# Patient Record
Sex: FEMALE | Race: WHITE | URBAN - METROPOLITAN AREA
[De-identification: names, ages, dates, MRNs, and addresses within clinical notes are randomized per-mention and may not be internally consistent; named-entity substitution may affect disease eponyms.]

---

## 2017-01-11 ENCOUNTER — OUTPATIENT (OUTPATIENT)
Dept: OUTPATIENT SERVICES | Facility: HOSPITAL | Age: 62
LOS: 1 days | End: 2017-01-11
Payer: COMMERCIAL

## 2017-01-11 ENCOUNTER — APPOINTMENT (OUTPATIENT)
Dept: UROLOGY | Facility: CLINIC | Age: 62
End: 2017-01-11

## 2017-01-11 DIAGNOSIS — N13.30 UNSPECIFIED HYDRONEPHROSIS: ICD-10-CM

## 2017-01-11 DIAGNOSIS — Z98.89 OTHER SPECIFIED POSTPROCEDURAL STATES: Chronic | ICD-10-CM

## 2017-01-11 DIAGNOSIS — E66.9 OBESITY, UNSPECIFIED: ICD-10-CM

## 2017-01-11 DIAGNOSIS — Z98.49 CATARACT EXTRACTION STATUS, UNSPECIFIED EYE: Chronic | ICD-10-CM

## 2017-01-11 DIAGNOSIS — R35.0 FREQUENCY OF MICTURITION: ICD-10-CM

## 2017-01-11 PROCEDURE — 76775 US EXAM ABDO BACK WALL LIM: CPT

## 2017-01-12 DIAGNOSIS — N13.30 UNSPECIFIED HYDRONEPHROSIS: ICD-10-CM

## 2017-01-12 DIAGNOSIS — N20.0 CALCULUS OF KIDNEY: ICD-10-CM

## 2017-01-12 DIAGNOSIS — E66.9 OBESITY, UNSPECIFIED: ICD-10-CM

## 2017-02-10 ENCOUNTER — APPOINTMENT (OUTPATIENT)
Dept: PULMONOLOGY | Facility: CLINIC | Age: 62
End: 2017-02-10

## 2017-03-09 ENCOUNTER — RX RENEWAL (OUTPATIENT)
Age: 62
End: 2017-03-09

## 2017-03-13 PROBLEM — Z29.9 PROPHYLACTIC MEASURE: Status: ACTIVE | Noted: 2017-03-13

## 2017-04-24 ENCOUNTER — RX RENEWAL (OUTPATIENT)
Age: 62
End: 2017-04-24

## 2017-07-19 ENCOUNTER — OUTPATIENT (OUTPATIENT)
Dept: OUTPATIENT SERVICES | Facility: HOSPITAL | Age: 62
LOS: 1 days | End: 2017-07-19
Payer: COMMERCIAL

## 2017-07-19 ENCOUNTER — APPOINTMENT (OUTPATIENT)
Dept: UROLOGY | Facility: CLINIC | Age: 62
End: 2017-07-19

## 2017-07-19 DIAGNOSIS — R35.0 FREQUENCY OF MICTURITION: ICD-10-CM

## 2017-07-19 DIAGNOSIS — Z98.89 OTHER SPECIFIED POSTPROCEDURAL STATES: Chronic | ICD-10-CM

## 2017-07-19 DIAGNOSIS — Z98.49 CATARACT EXTRACTION STATUS, UNSPECIFIED EYE: Chronic | ICD-10-CM

## 2017-07-19 PROCEDURE — 76775 US EXAM ABDO BACK WALL LIM: CPT

## 2017-07-21 DIAGNOSIS — N20.0 CALCULUS OF KIDNEY: ICD-10-CM

## 2017-09-04 ENCOUNTER — RX RENEWAL (OUTPATIENT)
Age: 62
End: 2017-09-04

## 2017-09-06 ENCOUNTER — RX RENEWAL (OUTPATIENT)
Age: 62
End: 2017-09-06

## 2017-09-21 ENCOUNTER — RX RENEWAL (OUTPATIENT)
Age: 62
End: 2017-09-21

## 2017-10-17 ENCOUNTER — RX RENEWAL (OUTPATIENT)
Age: 62
End: 2017-10-17

## 2017-10-22 ENCOUNTER — RX RENEWAL (OUTPATIENT)
Age: 62
End: 2017-10-22

## 2017-10-24 ENCOUNTER — APPOINTMENT (OUTPATIENT)
Dept: PULMONOLOGY | Facility: CLINIC | Age: 62
End: 2017-10-24

## 2017-11-14 ENCOUNTER — OUTPATIENT (OUTPATIENT)
Dept: OUTPATIENT SERVICES | Facility: HOSPITAL | Age: 62
LOS: 1 days | End: 2017-11-14
Payer: COMMERCIAL

## 2017-11-14 ENCOUNTER — APPOINTMENT (OUTPATIENT)
Dept: PULMONOLOGY | Facility: CLINIC | Age: 62
End: 2017-11-14
Payer: COMMERCIAL

## 2017-11-14 VITALS
WEIGHT: 192.02 LBS | HEIGHT: 65 IN | OXYGEN SATURATION: 98 % | SYSTOLIC BLOOD PRESSURE: 136 MMHG | HEART RATE: 73 BPM | RESPIRATION RATE: 16 BRPM | TEMPERATURE: 99 F | DIASTOLIC BLOOD PRESSURE: 84 MMHG

## 2017-11-14 VITALS
OXYGEN SATURATION: 96 % | WEIGHT: 186 LBS | HEART RATE: 82 BPM | HEIGHT: 64 IN | SYSTOLIC BLOOD PRESSURE: 126 MMHG | BODY MASS INDEX: 31.76 KG/M2 | DIASTOLIC BLOOD PRESSURE: 80 MMHG

## 2017-11-14 DIAGNOSIS — J30.9 ALLERGIC RHINITIS, UNSPECIFIED: ICD-10-CM

## 2017-11-14 DIAGNOSIS — M19.042 PRIMARY OSTEOARTHRITIS, LEFT HAND: ICD-10-CM

## 2017-11-14 DIAGNOSIS — Z98.49 CATARACT EXTRACTION STATUS, UNSPECIFIED EYE: Chronic | ICD-10-CM

## 2017-11-14 DIAGNOSIS — G25.81 RESTLESS LEGS SYNDROME: ICD-10-CM

## 2017-11-14 DIAGNOSIS — K21.9 GASTRO-ESOPHAGEAL REFLUX DISEASE W/OUT ESOPHAGITIS: ICD-10-CM

## 2017-11-14 DIAGNOSIS — R76.8 OTHER SPECIFIED ABNORMAL IMMUNOLOGICAL FINDINGS IN SERUM: ICD-10-CM

## 2017-11-14 DIAGNOSIS — E11.9 TYPE 2 DIABETES MELLITUS WITHOUT COMPLICATIONS: ICD-10-CM

## 2017-11-14 DIAGNOSIS — G47.33 OBSTRUCTIVE SLEEP APNEA (ADULT) (PEDIATRIC): ICD-10-CM

## 2017-11-14 DIAGNOSIS — I67.1 CEREBRAL ANEURYSM, NONRUPTURED: ICD-10-CM

## 2017-11-14 DIAGNOSIS — Z98.89 OTHER SPECIFIED POSTPROCEDURAL STATES: Chronic | ICD-10-CM

## 2017-11-14 LAB
BUN SERPL-MCNC: 11 MG/DL — SIGNIFICANT CHANGE UP (ref 7–23)
CALCIUM SERPL-MCNC: 9 MG/DL — SIGNIFICANT CHANGE UP (ref 8.4–10.5)
CHLORIDE SERPL-SCNC: 99 MMOL/L — SIGNIFICANT CHANGE UP (ref 98–107)
CO2 SERPL-SCNC: 26 MMOL/L — SIGNIFICANT CHANGE UP (ref 22–31)
CREAT SERPL-MCNC: 0.74 MG/DL — SIGNIFICANT CHANGE UP (ref 0.5–1.3)
GLUCOSE SERPL-MCNC: 135 MG/DL — HIGH (ref 70–99)
HBA1C BLD-MCNC: 6.9 % — HIGH (ref 4–5.6)
HCT VFR BLD CALC: 39.4 % — SIGNIFICANT CHANGE UP (ref 34.5–45)
HGB BLD-MCNC: 13.2 G/DL — SIGNIFICANT CHANGE UP (ref 11.5–15.5)
MCHC RBC-ENTMCNC: 31.1 PG — SIGNIFICANT CHANGE UP (ref 27–34)
MCHC RBC-ENTMCNC: 33.5 % — SIGNIFICANT CHANGE UP (ref 32–36)
MCV RBC AUTO: 92.7 FL — SIGNIFICANT CHANGE UP (ref 80–100)
NRBC # FLD: 0 — SIGNIFICANT CHANGE UP
PLATELET # BLD AUTO: 177 K/UL — SIGNIFICANT CHANGE UP (ref 150–400)
PMV BLD: 11.9 FL — SIGNIFICANT CHANGE UP (ref 7–13)
POTASSIUM SERPL-MCNC: 3 MMOL/L — LOW (ref 3.5–5.3)
POTASSIUM SERPL-SCNC: 3 MMOL/L — LOW (ref 3.5–5.3)
RBC # BLD: 4.25 M/UL — SIGNIFICANT CHANGE UP (ref 3.8–5.2)
RBC # FLD: 12.4 % — SIGNIFICANT CHANGE UP (ref 10.3–14.5)
SODIUM SERPL-SCNC: 140 MMOL/L — SIGNIFICANT CHANGE UP (ref 135–145)
WBC # BLD: 5.88 K/UL — SIGNIFICANT CHANGE UP (ref 3.8–10.5)
WBC # FLD AUTO: 5.88 K/UL — SIGNIFICANT CHANGE UP (ref 3.8–10.5)

## 2017-11-14 PROCEDURE — 99214 OFFICE O/P EST MOD 30 MIN: CPT

## 2017-11-14 PROCEDURE — 93010 ELECTROCARDIOGRAM REPORT: CPT

## 2017-11-14 RX ORDER — MONTELUKAST SODIUM 10 MG/1
10 TABLET, FILM COATED ORAL
Qty: 1 | Refills: 1 | Status: ACTIVE | COMMUNITY
Start: 2017-11-14 | End: 1900-01-01

## 2017-11-14 NOTE — H&P PST ADULT - PMH
Asthma  never been intubated, or hospitalized.,  Carpal Tunnel Syndrome    Cervical herniated disc    Diabetes mellitus  II  DVT (deep venous thrombosis)  right lower leg 2015, s/p right knee surgery to correct congenital abnormality  GERD (Gastroesophageal Reflux Disease)    HTN - Hypertension    Hyperlipidemia    Migraine    MVP (Mitral Valve Prolapse)    Obesity    Renal Calculi    TIA (transient ischemic attack)  x3 last one in 2/16

## 2017-11-14 NOTE — H&P PST ADULT - RS GEN PE MLT RESP DETAILS PC
respirations non-labored/no subcutaneous emphysema/no rhonchi/no wheezes/no chest wall tenderness/no intercostal retractions/clear to auscultation bilaterally/airway patent/good air movement/no rales/breath sounds equal

## 2017-11-14 NOTE — H&P PST ADULT - PROBLEM SELECTOR PLAN 2
Monitor BS on day of surgery. Pt instructed to hold metformin 24 hours prior to surgery. Pt verbalized understanding.

## 2017-11-14 NOTE — H&P PST ADULT - NEGATIVE GENERAL SYMPTOMS
no polyuria/no anorexia/no weight loss/no weight gain/no polyphagia/no fever/no sweating/no chills/no fatigue

## 2017-11-14 NOTE — H&P PST ADULT - NEGATIVE CARDIOVASCULAR SYMPTOMS
no claudication/no chest pain/no palpitations/no paroxysmal nocturnal dyspnea/no dyspnea on exertion/no orthopnea

## 2017-11-14 NOTE — H&P PST ADULT - PSH
Brain Aneurysm (coiling)  2008  Cervical Fusion x 2  1999, 2000  H/O cataract extraction  2013  S/P Arthroscopic Knee Surgery  2006  S/P Total Abdominal Hysterectomy  1991

## 2017-11-14 NOTE — H&P PST ADULT - NEGATIVE ENMT SYMPTOMS
no vertigo/no nasal congestion/no gum bleeding/no throat pain/no nose bleeds/no nasal obstruction/no post-nasal discharge/no abnormal taste sensation/no nasal discharge/no sinus symptoms/no hearing difficulty/no dysphagia/no ear pain/no tinnitus

## 2017-11-14 NOTE — H&P PST ADULT - NEGATIVE FEMALE-SPECIFIC SYMPTOMS
no abnormal vaginal bleeding/no pelvic pain/no amenorrhea/no vaginal discharge/no menorrhagia/no spotting

## 2017-11-14 NOTE — H&P PST ADULT - NEGATIVE OPHTHALMOLOGIC SYMPTOMS
no lacrimation L/no diplopia/no blurred vision R/no discharge R/no pain L/no irritation L/no loss of vision R/no lacrimation R/no blurred vision L/no pain R/no irritation R/no loss of vision L

## 2017-11-14 NOTE — H&P PST ADULT - NSANTHOSAYNRD_GEN_A_CORE
No. BRYN screening performed.  STOP BANG Legend: 0-2 = LOW Risk; 3-4 = INTERMEDIATE Risk; 5-8 = HIGH Risk

## 2017-11-14 NOTE — H&P PST ADULT - MUSCULOSKELETAL
details… No joint pain, swelling or deformity; no limitation of movement detailed exam left ring finger/decreased ROM due to pain

## 2017-11-14 NOTE — H&P PST ADULT - PROBLEM SELECTOR PLAN 1
Scheduled for left ring finger trigger release on 11/20/17. Pre op instructions given and explained. Pt verbalized understanding.

## 2017-11-14 NOTE — H&P PST ADULT - NEGATIVE GASTROINTESTINAL SYMPTOMS
no change in bowel habits/no melena/no nausea/no vomiting/no diarrhea/no constipation/no abdominal pain/no hiccoughs

## 2017-11-14 NOTE — H&P PST ADULT - NEGATIVE GENERAL GENITOURINARY SYMPTOMS
no bladder infections/no dysuria/no urinary hesitancy/no hematuria/no nocturia/no flank pain R/no flank pain L/normal libido/no urine discoloration/no gas in urine

## 2017-11-14 NOTE — H&P PST ADULT - NEGATIVE BREAST SYMPTOMS
no breast tenderness L/no breast tenderness R/no nipple discharge R/no nipple discharge L/no breast lump R/no breast lump L

## 2017-11-14 NOTE — H&P PST ADULT - NEGATIVE NEUROLOGICAL SYMPTOMS
no generalized seizures/no focal seizures/no syncope/no vertigo/no loss of consciousness/no hemiparesis/no paresthesias/no tremors/no confusion/no weakness

## 2017-11-14 NOTE — H&P PST ADULT - NECK DETAILS
normal thyroid gland/supple/no JVD decreased ROM on lateral bending/supple/no JVD/normal thyroid gland

## 2017-11-14 NOTE — H&P PST ADULT - HISTORY OF PRESENT ILLNESS
62 yr old  female with PMH: Brain Aneurysm, HTN, HLD, h/o renal calculi, pre op diagnosis left hydronephrosis renal calculi, presents to PST for pre surgical evaluation for cystoscopy left ureteroscopy and stent insertion on 4/25/16  Pt PMH includes Cervical fusion with hardware 2000 and limited mobility of neck, Ruptured Brain Aneurysm with coil placement 2008  residual HA and decreased ability for complicated sensory input.   Pt also states Hx of DVT, RLE following knee surgery 2013, 2015 and CVA following brain angiogram done last year  as f/u testing - no residual deficits. 62 yr old  female with PMH: Ruptured Brain Aneurysm with coil placement 2008  residual HA, HTN, DVT- RLE, HLD, h/o renal calculi s/p  cystoscopy left ureteroscopy and stent insertion on 4/25/16. Cervical fusion with hardware 2000 and limited mobility of neck and decreased ability for complicated sensory input. Pt presents to PST for pre op evaluation with hx of left ring finger pain. Now scheduled for left ring finger trigger release on 11/20/17

## 2017-11-15 ENCOUNTER — APPOINTMENT (OUTPATIENT)
Dept: PULMONOLOGY | Facility: CLINIC | Age: 62
End: 2017-11-15

## 2017-11-20 ENCOUNTER — TRANSCRIPTION ENCOUNTER (OUTPATIENT)
Age: 62
End: 2017-11-20

## 2017-11-20 ENCOUNTER — OUTPATIENT (OUTPATIENT)
Dept: OUTPATIENT SERVICES | Facility: HOSPITAL | Age: 62
LOS: 1 days | Discharge: HOME | End: 2017-11-20

## 2017-11-20 VITALS
HEIGHT: 65 IN | DIASTOLIC BLOOD PRESSURE: 84 MMHG | WEIGHT: 192.02 LBS | RESPIRATION RATE: 16 BRPM | OXYGEN SATURATION: 98 % | HEART RATE: 73 BPM | TEMPERATURE: 99 F | SYSTOLIC BLOOD PRESSURE: 136 MMHG

## 2017-11-20 VITALS
DIASTOLIC BLOOD PRESSURE: 73 MMHG | SYSTOLIC BLOOD PRESSURE: 146 MMHG | OXYGEN SATURATION: 100 % | RESPIRATION RATE: 16 BRPM

## 2017-11-20 DIAGNOSIS — Z98.49 CATARACT EXTRACTION STATUS, UNSPECIFIED EYE: Chronic | ICD-10-CM

## 2017-11-20 DIAGNOSIS — M19.042 PRIMARY OSTEOARTHRITIS, LEFT HAND: ICD-10-CM

## 2017-11-20 DIAGNOSIS — Z98.89 OTHER SPECIFIED POSTPROCEDURAL STATES: Chronic | ICD-10-CM

## 2017-11-20 NOTE — BRIEF OPERATIVE NOTE - PROCEDURE
<<-----Click on this checkbox to enter Procedure Trigger finger release  11/20/2017    Active  NATALIOO

## 2017-11-20 NOTE — ASU DISCHARGE PLAN (ADULT/PEDIATRIC). - ITEMS TO FOLLOWUP WITH YOUR PHYSICIAN'S
Please follow up with Dr. Vee within 1-2 weeks. You may call [phone] to schedule an appointment.    You may resume a regular diet and regular activities. Please do not participate in heavy lifting or strenuous exercise. Do not drive while taking pain medication.    Please go to the emergency department if you experience pain not controlled by pain medication, bleeding that will not stop, fevers or chills.

## 2017-11-20 NOTE — ASU DISCHARGE PLAN (ADULT/PEDIATRIC). - INSTRUCTIONS
You may resume a regular diet You may resume a regular diet and regular activities. Please do not participate in heavy lifting or strenuous exercise. Do not drive while taking pain medication.    Please go to the emergency department if you experience pain not controlled by pain medication, bleeding that will not stop, fevers or chills. You may resume a regular diet... Keep first meal light. Nothing fried, spicy or greasy. Increase fluids.

## 2017-11-20 NOTE — ASU DISCHARGE PLAN (ADULT/PEDIATRIC). - NOTIFY
Fever greater than 101/Pain not relieved by Medications/Bleeding that does not stop Fever greater than 101/Persistent Nausea and Vomiting/Unable to Urinate/Pain not relieved by Medications/Swelling that continues/Bleeding that does not stop/Numbness, color, or temperature change to extremity

## 2017-11-20 NOTE — ASU DISCHARGE PLAN (ADULT/PEDIATRIC). - MEDICATION SUMMARY - MEDICATIONS TO TAKE
I will START or STAY ON the medications listed below when I get home from the hospital:    predniSONE 50 mg oral tablet  -- 1 tab(s) by mouth once a day  -- It is very important that you take or use this exactly as directed.  Do not skip doses or discontinue unless directed by your doctor.  Obtain medical advice before taking any non-prescription drugs as some may affect the action of this medication.  Take with food or milk.    -- Indication: For Steroid    oxyCODONE-acetaminophen 5 mg-325 mg oral tablet  -- 1 tab(s) by mouth every 6 hours MDD:4 per day  -- Caution federal law prohibits the transfer of this drug to any person other  than the person for whom it was prescribed.  May cause drowsiness.  Alcohol may intensify this effect.  Use care when operating dangerous machinery.  This prescription cannot be refilled.  This product contains acetaminophen.  Do not use  with any other product containing acetaminophen to prevent possible liver damage.  Using more of this medication than prescribed may cause serious breathing problems.    -- Indication: For Pain    oxyCODONE 5 mg oral tablet  -- 1 tab(s) by mouth every 6 hours MDD:4  -- Caution federal law prohibits the transfer of this drug to any person other  than the person for whom it was prescribed.  It is very important that you take or use this exactly as directed.  Do not skip doses or discontinue unless directed by your doctor.  May cause drowsiness.  Alcohol may intensify this effect.  Use care when operating dangerous machinery.  This prescription cannot be refilled.  Using more of this medication than prescribed may cause serious breathing problems.    -- Indication: For Pain    Flomax 0.4 mg oral capsule  -- 1 cap(s) by mouth once a day  -- It is very important that you take or use this exactly as directed.  Do not skip doses or discontinue unless directed by your doctor.  May cause drowsiness.  Alcohol may intensify this effect.  Use care when operating dangerous machinery.  Some non-prescription drugs may aggravate your condition.  Read all labels carefully.  If a warning appears, check with your doctor before taking.  Swallow whole.  Do not crush.  Take with food or milk.    -- Indication: For BPH    Allegra  --  by mouth   -- Indication: For Allergy    Zocor 40 mg oral tablet  -- 1 tab(s) by mouth once a day (at bedtime)  -- Indication: For HLD    Lunesta 3 mg oral tablet  -- 1 tab(s) by mouth once a day (at bedtime)  -- Indication: For Mood    Advair Diskus  --  inhaled   -- Indication: For Asthma    Norvasc 5 mg oral tablet  -- 1 tab(s) by mouth once a day  -- Indication: For HTN    cefpodoxime 200 mg oral tablet  -- 1 tab(s) by mouth every 12 hours  -- Finish all this medication unless otherwise directed by prescriber.  Take with food or milk.    -- Indication: For Infection    Singulair  --  by mouth   -- Indication: For Asthma    NexIUM 40 mg oral delayed release capsule  -- 1 cap(s) by mouth once a day  -- Indication: For GERD

## 2018-01-25 ENCOUNTER — RX RENEWAL (OUTPATIENT)
Age: 63
End: 2018-01-25

## 2018-01-30 ENCOUNTER — RX RENEWAL (OUTPATIENT)
Age: 63
End: 2018-01-30

## 2018-02-07 ENCOUNTER — APPOINTMENT (OUTPATIENT)
Dept: UROLOGY | Facility: CLINIC | Age: 63
End: 2018-02-07

## 2018-02-25 ENCOUNTER — RX RENEWAL (OUTPATIENT)
Age: 63
End: 2018-02-25

## 2018-02-26 ENCOUNTER — RX RENEWAL (OUTPATIENT)
Age: 63
End: 2018-02-26

## 2018-03-10 ENCOUNTER — OUTPATIENT (OUTPATIENT)
Dept: OUTPATIENT SERVICES | Facility: HOSPITAL | Age: 63
LOS: 1 days | End: 2018-03-10
Payer: COMMERCIAL

## 2018-03-10 ENCOUNTER — APPOINTMENT (OUTPATIENT)
Dept: ULTRASOUND IMAGING | Facility: IMAGING CENTER | Age: 63
End: 2018-03-10
Payer: COMMERCIAL

## 2018-03-10 DIAGNOSIS — Z98.49 CATARACT EXTRACTION STATUS, UNSPECIFIED EYE: Chronic | ICD-10-CM

## 2018-03-10 DIAGNOSIS — Z98.89 OTHER SPECIFIED POSTPROCEDURAL STATES: Chronic | ICD-10-CM

## 2018-03-10 DIAGNOSIS — N20.0 CALCULUS OF KIDNEY: ICD-10-CM

## 2018-03-10 PROCEDURE — 76770 US EXAM ABDO BACK WALL COMP: CPT

## 2018-03-11 ENCOUNTER — FORM ENCOUNTER (OUTPATIENT)
Age: 63
End: 2018-03-11

## 2018-03-12 PROCEDURE — 76770 US EXAM ABDO BACK WALL COMP: CPT | Mod: 26

## 2018-03-26 ENCOUNTER — RX RENEWAL (OUTPATIENT)
Age: 63
End: 2018-03-26

## 2018-04-09 ENCOUNTER — RX RENEWAL (OUTPATIENT)
Age: 63
End: 2018-04-09

## 2018-05-11 ENCOUNTER — RX RENEWAL (OUTPATIENT)
Age: 63
End: 2018-05-11

## 2018-05-14 ENCOUNTER — APPOINTMENT (OUTPATIENT)
Dept: PULMONOLOGY | Facility: CLINIC | Age: 63
End: 2018-05-14

## 2018-05-24 ENCOUNTER — RX RENEWAL (OUTPATIENT)
Age: 63
End: 2018-05-24

## 2018-05-24 ENCOUNTER — MEDICATION RENEWAL (OUTPATIENT)
Age: 63
End: 2018-05-24

## 2018-05-24 NOTE — BRIEF OPERATIVE NOTE - ASSISTANT(S)
Tomeka Strong is a 63 year old female presenting with sinus congestion. Patient noticed symptoms of severe congestion, green mucus and and loss of voice since Sunday night.    denies latex allergy or sensitivity.    Patient would like communication of their results via:      Cell Phone:   Telephone Information:   Mobile [phone]     Okay to leave a message containing results? Yes    Medications reviewed and updated.    History   Smoking Status   • Never Smoker   Smokeless Tobacco   • Never Used       Health Maintenance Summary     Topic Due On Due Status Completed On    MAMMOGRAM - BREAST CANCER SCREENING Feb 6, 2020 Not Due Feb 6, 2018    Pap Smear - Cervical Cancer Screening  Jan 16, 2023 Not Due Jan 16, 2018    Colorectal Cancer Screening - Colonoscopy Jan 1, 2020 Not Due Jan 1, 2015    Immunization - TDAP Pregnancy  Hidden     IMMUNIZATION - DTaP/Tdap/Td Jul 31, 2027 Not Due Jul 31, 2017    Immunization-Influenza  Completed Sep 26, 2017    Depression Screening Jan 18, 2019 Not Due Jan 18, 2018    Hepatitis C Screening  Completed Jan 18, 2018          There are no preventive care reminders to display for this patient.          Above listed discussed with provider.     Red

## 2018-06-01 ENCOUNTER — APPOINTMENT (OUTPATIENT)
Dept: PULMONOLOGY | Facility: CLINIC | Age: 63
End: 2018-06-01

## 2018-06-15 ENCOUNTER — APPOINTMENT (OUTPATIENT)
Dept: UROLOGY | Facility: CLINIC | Age: 63
End: 2018-06-15
Payer: COMMERCIAL

## 2018-06-15 PROCEDURE — 99213 OFFICE O/P EST LOW 20 MIN: CPT

## 2018-12-06 ENCOUNTER — FORM ENCOUNTER (OUTPATIENT)
Age: 63
End: 2018-12-06

## 2018-12-07 ENCOUNTER — TRANSCRIPTION ENCOUNTER (OUTPATIENT)
Age: 63
End: 2018-12-07

## 2018-12-07 ENCOUNTER — OUTPATIENT (OUTPATIENT)
Dept: OUTPATIENT SERVICES | Facility: HOSPITAL | Age: 63
LOS: 1 days | End: 2018-12-07
Payer: COMMERCIAL

## 2018-12-07 ENCOUNTER — OTHER (OUTPATIENT)
Age: 63
End: 2018-12-07

## 2018-12-07 ENCOUNTER — APPOINTMENT (OUTPATIENT)
Dept: CT IMAGING | Facility: CLINIC | Age: 63
End: 2018-12-07
Payer: COMMERCIAL

## 2018-12-07 DIAGNOSIS — Z98.89 OTHER SPECIFIED POSTPROCEDURAL STATES: Chronic | ICD-10-CM

## 2018-12-07 DIAGNOSIS — Z98.49 CATARACT EXTRACTION STATUS, UNSPECIFIED EYE: Chronic | ICD-10-CM

## 2018-12-07 DIAGNOSIS — Z00.8 ENCOUNTER FOR OTHER GENERAL EXAMINATION: ICD-10-CM

## 2018-12-07 PROCEDURE — 74176 CT ABD & PELVIS W/O CONTRAST: CPT | Mod: 26

## 2018-12-07 PROCEDURE — 74176 CT ABD & PELVIS W/O CONTRAST: CPT

## 2019-03-15 ENCOUNTER — TRANSCRIPTION ENCOUNTER (OUTPATIENT)
Age: 64
End: 2019-03-15

## 2019-03-18 ENCOUNTER — OUTPATIENT (OUTPATIENT)
Dept: OUTPATIENT SERVICES | Facility: HOSPITAL | Age: 64
LOS: 1 days | End: 2019-03-18
Payer: MEDICAID

## 2019-03-18 ENCOUNTER — APPOINTMENT (OUTPATIENT)
Dept: CARDIOLOGY | Facility: CLINIC | Age: 64
End: 2019-03-18

## 2019-03-18 DIAGNOSIS — R07.9 CHEST PAIN, UNSPECIFIED: ICD-10-CM

## 2019-03-18 DIAGNOSIS — Z98.89 OTHER SPECIFIED POSTPROCEDURAL STATES: Chronic | ICD-10-CM

## 2019-03-18 DIAGNOSIS — I25.10 ATHEROSCLEROTIC HEART DISEASE OF NATIVE CORONARY ARTERY WITHOUT ANGINA PECTORIS: ICD-10-CM

## 2019-03-18 DIAGNOSIS — Z98.49 CATARACT EXTRACTION STATUS, UNSPECIFIED EYE: Chronic | ICD-10-CM

## 2019-03-18 PROCEDURE — 75574 CT ANGIO HRT W/3D IMAGE: CPT

## 2019-03-18 PROCEDURE — 75574 CT ANGIO HRT W/3D IMAGE: CPT | Mod: 26

## 2019-06-12 ENCOUNTER — APPOINTMENT (OUTPATIENT)
Dept: UROLOGY | Facility: CLINIC | Age: 64
End: 2019-06-12
Payer: MEDICAID

## 2019-06-12 ENCOUNTER — OUTPATIENT (OUTPATIENT)
Dept: OUTPATIENT SERVICES | Facility: HOSPITAL | Age: 64
LOS: 1 days | End: 2019-06-12
Payer: MEDICAID

## 2019-06-12 DIAGNOSIS — Z98.49 CATARACT EXTRACTION STATUS, UNSPECIFIED EYE: Chronic | ICD-10-CM

## 2019-06-12 DIAGNOSIS — J44.9 CHRONIC OBSTRUCTIVE PULMONARY DISEASE, UNSPECIFIED: ICD-10-CM

## 2019-06-12 DIAGNOSIS — R35.0 FREQUENCY OF MICTURITION: ICD-10-CM

## 2019-06-12 DIAGNOSIS — Z98.89 OTHER SPECIFIED POSTPROCEDURAL STATES: Chronic | ICD-10-CM

## 2019-06-12 DIAGNOSIS — N20.0 CALCULUS OF KIDNEY: ICD-10-CM

## 2019-06-12 DIAGNOSIS — J45.50 SEVERE PERSISTENT ASTHMA, UNCOMPLICATED: ICD-10-CM

## 2019-06-12 PROCEDURE — 76775 US EXAM ABDO BACK WALL LIM: CPT

## 2019-06-12 PROCEDURE — 99212 OFFICE O/P EST SF 10 MIN: CPT | Mod: 25

## 2019-06-12 PROCEDURE — 76775 US EXAM ABDO BACK WALL LIM: CPT | Mod: 26

## 2019-06-12 NOTE — HISTORY OF PRESENT ILLNESS
[FreeTextEntry1] : Pt returns- now 65 yo female s/p left URS with laser for obstructing stone at [facility] 4/25/16. Stone analysis COM 70%, COD 10%, CP 20%\par \par Feeling well at this time. No flank pain, abd pain, fever. \par \par Renal us in f/u- feeling well.  No new health changes, no new meds/allergies.  Doing well.\par \par Initial 24 hour urine with low volume- on diet mod. Returns today for renal us f/u.\par \par Patient is currently experiencing no symptoms. No hematuria, no dysuria, no flank or abdominal pain.  No changes to meds or allergies.\par \par  [None] : no symptoms

## 2019-06-12 NOTE — PHYSICAL EXAM
[General Appearance - Well Developed] : well developed [General Appearance - Well Nourished] : well nourished [Normal Appearance] : normal appearance [Well Groomed] : well groomed [Abdomen Soft] : soft [General Appearance - In No Acute Distress] : no acute distress [Abdomen Tenderness] : non-tender [] : no respiratory distress [Costovertebral Angle Tenderness] : no ~M costovertebral angle tenderness [Respiration, Rhythm And Depth] : normal respiratory rhythm and effort [Exaggerated Use Of Accessory Muscles For Inspiration] : no accessory muscle use [Oriented To Time, Place, And Person] : oriented to person, place, and time [Affect] : the affect was normal [Mood] : the mood was normal [Normal Station and Gait] : the gait and station were normal for the patient's age [Not Anxious] : not anxious [No Focal Deficits] : no focal deficits

## 2019-06-12 NOTE — ASSESSMENT
[FreeTextEntry1] : Renal US today: no stone, no hydro, no solid mass.  Small 1.9 cm cyst left kidney, simple.\par \par Doing well overall.  Now ~ 3 years. \par \par Doing well without stone recurrence.\par \par RTC 1 year with renal us-\par pt prefers to continue f/u.\par \par Note: CT 12/2018 no recurrent stone.

## 2019-06-14 DIAGNOSIS — J44.9 CHRONIC OBSTRUCTIVE PULMONARY DISEASE, UNSPECIFIED: ICD-10-CM

## 2019-06-14 DIAGNOSIS — I67.1 CEREBRAL ANEURYSM, NONRUPTURED: ICD-10-CM

## 2019-06-14 DIAGNOSIS — N20.0 CALCULUS OF KIDNEY: ICD-10-CM

## 2019-06-14 DIAGNOSIS — J45.50 SEVERE PERSISTENT ASTHMA, UNCOMPLICATED: ICD-10-CM

## 2019-10-27 ENCOUNTER — EMERGENCY (EMERGENCY)
Facility: HOSPITAL | Age: 64
LOS: 1 days | Discharge: HOME | End: 2019-10-27
Attending: EMERGENCY MEDICINE
Payer: COMMERCIAL

## 2019-10-27 VITALS
DIASTOLIC BLOOD PRESSURE: 83 MMHG | HEIGHT: 64 IN | OXYGEN SATURATION: 97 % | TEMPERATURE: 98 F | SYSTOLIC BLOOD PRESSURE: 134 MMHG | WEIGHT: 190.92 LBS | HEART RATE: 82 BPM | RESPIRATION RATE: 18 BRPM

## 2019-10-27 DIAGNOSIS — Z98.49 CATARACT EXTRACTION STATUS, UNSPECIFIED EYE: Chronic | ICD-10-CM

## 2019-10-27 DIAGNOSIS — Z98.89 OTHER SPECIFIED POSTPROCEDURAL STATES: Chronic | ICD-10-CM

## 2019-10-27 PROCEDURE — 99284 EMERGENCY DEPT VISIT MOD MDM: CPT

## 2019-10-27 PROCEDURE — 70450 CT HEAD/BRAIN W/O DYE: CPT | Mod: 26

## 2019-10-27 PROCEDURE — 72125 CT NECK SPINE W/O DYE: CPT | Mod: 26
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
## 2019-10-27 NOTE — ED PROVIDER NOTE - PSH
Brain Aneurysm (coiling)  2008  Cervical Fusion x 2  1999, 2000  H/O cataract extraction  2013  S/P Arthroscopic Knee Surgery  2006  S/P knee surgery  2015 right  S/P Total Abdominal Hysterectomy  1991

## 2019-10-27 NOTE — ED PROVIDER NOTE - PHYSICAL EXAMINATION
nml General: In NAD.  Head: NC/AT.   Eyes: No raccoon eyes. PERRLA, EOMI, no nystagmus.  Ears: No bazan signs or hemotympanum b/l.  Mouth: No dental injuries.  Neck: No abrasions or ecchymosis. Supple, no midline tenderness to palpation. Mild left sided lumbar paraspinal TTP. No bony step offs. FROM.  Cardiac: No lifts, heaves, visible pulsations, or thrills. Rate and rhythm regular, S1 & S2 clear. No audible murmur, gallop, or rub.   Chest/Lungs: No deformity, ecchymosis, abrasions. Negative seatbelt sign. Normal AP to lateral diameter. Symmetrical excursion b/l. No chest wall tenderness. Breath sounds vesicular, symmetrical and without rales, rhonchi or wheezing b/l.   PV: Radial, DP, PT pulses 2+. Capillary refill <2 seconds.  Abdomen: No scars, lesions, ecchymosis, or visible pulsations. Soft, non-tender, non-distended, no masses palpated. No bruits. No hepatosplenomegaly to palpitation. No CVA tenderness.  Extremities: Atraumatic. No deformity. No snuff box tenderness. Pelvis stable. FROM.  Neuro: GCS 15. A&Ox3, clear speech, steady gait, cerebellar intact, no focal deficits. Motor intact. Sensation intact to b/l upper and lower extremities.
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
## 2019-10-27 NOTE — ED PROVIDER NOTE - PATIENT PORTAL LINK FT
You can access the FollowMyHealth Patient Portal offered by [facility] by registering at the following website: [email or web address]. By joining [employer]’s FollowMyHealth portal, you will also be able to view your health information using other applications (apps) compatible with our system.

## 2019-10-28 PROCEDURE — 99285 EMERGENCY DEPT VISIT HI MDM: CPT | Mod: 25

## 2019-10-28 PROCEDURE — 70450 CT HEAD/BRAIN W/O DYE: CPT

## 2019-10-28 PROCEDURE — 72125 CT NECK SPINE W/O DYE: CPT

## 2019-10-28 RX ORDER — CYCLOBENZAPRINE HYDROCHLORIDE 10 MG/1
10 TABLET, FILM COATED ORAL ONCE
Refills: 0 | Status: COMPLETED | OUTPATIENT
Start: 2019-10-28 | End: 2019-10-28

## 2019-10-28 RX ADMIN — CYCLOBENZAPRINE HYDROCHLORIDE 10 MILLIGRAM(S): 10 TABLET, FILM COATED ORAL at 00:20

## 2019-11-01 ENCOUNTER — OUTPATIENT (OUTPATIENT)
Dept: OUTPATIENT SERVICES | Facility: HOSPITAL | Age: 64
LOS: 1 days | End: 2019-11-01

## 2019-11-01 DIAGNOSIS — Z98.49 CATARACT EXTRACTION STATUS, UNSPECIFIED EYE: Chronic | ICD-10-CM

## 2019-11-01 DIAGNOSIS — Z98.89 OTHER SPECIFIED POSTPROCEDURAL STATES: Chronic | ICD-10-CM

## 2019-11-11 DIAGNOSIS — Z71.89 OTHER SPECIFIED COUNSELING: ICD-10-CM

## 2019-12-07 ENCOUNTER — EMERGENCY (EMERGENCY)
Facility: HOSPITAL | Age: 64
LOS: 1 days | Discharge: HOME | End: 2019-12-07
Attending: EMERGENCY MEDICINE
Payer: MEDICAID

## 2019-12-07 VITALS
DIASTOLIC BLOOD PRESSURE: 72 MMHG | SYSTOLIC BLOOD PRESSURE: 157 MMHG | OXYGEN SATURATION: 96 % | HEART RATE: 77 BPM | WEIGHT: 190.92 LBS | HEIGHT: 64 IN | RESPIRATION RATE: 22 BRPM | TEMPERATURE: 98 F

## 2019-12-07 DIAGNOSIS — Z98.89 OTHER SPECIFIED POSTPROCEDURAL STATES: Chronic | ICD-10-CM

## 2019-12-07 DIAGNOSIS — Z98.49 CATARACT EXTRACTION STATUS, UNSPECIFIED EYE: Chronic | ICD-10-CM

## 2019-12-07 LAB
ALBUMIN SERPL ELPH-MCNC: 4.4 G/DL — SIGNIFICANT CHANGE UP (ref 3.3–5.2)
ALP SERPL-CCNC: 89 U/L — SIGNIFICANT CHANGE UP (ref 40–120)
ALT FLD-CCNC: 24 U/L — SIGNIFICANT CHANGE UP
ANION GAP SERPL CALC-SCNC: 14 MMOL/L — SIGNIFICANT CHANGE UP (ref 5–17)
AST SERPL-CCNC: 24 U/L — SIGNIFICANT CHANGE UP
BASOPHILS # BLD AUTO: 0.05 K/UL — SIGNIFICANT CHANGE UP (ref 0–0.2)
BASOPHILS NFR BLD AUTO: 0.5 % — SIGNIFICANT CHANGE UP (ref 0–2)
BILIRUB SERPL-MCNC: 0.4 MG/DL — SIGNIFICANT CHANGE UP (ref 0.4–2)
BUN SERPL-MCNC: 12 MG/DL — SIGNIFICANT CHANGE UP (ref 8–20)
CALCIUM SERPL-MCNC: 9.1 MG/DL — SIGNIFICANT CHANGE UP (ref 8.6–10.2)
CHLORIDE SERPL-SCNC: 99 MMOL/L — SIGNIFICANT CHANGE UP (ref 98–107)
CO2 SERPL-SCNC: 24 MMOL/L — SIGNIFICANT CHANGE UP (ref 22–29)
CREAT SERPL-MCNC: 0.6 MG/DL — SIGNIFICANT CHANGE UP (ref 0.5–1.3)
EOSINOPHIL # BLD AUTO: 0.41 K/UL — SIGNIFICANT CHANGE UP (ref 0–0.5)
EOSINOPHIL NFR BLD AUTO: 3.9 % — SIGNIFICANT CHANGE UP (ref 0–6)
GLUCOSE SERPL-MCNC: 105 MG/DL — SIGNIFICANT CHANGE UP (ref 70–115)
HCT VFR BLD CALC: 40.6 % — SIGNIFICANT CHANGE UP (ref 34.5–45)
HGB BLD-MCNC: 13.4 G/DL — SIGNIFICANT CHANGE UP (ref 11.5–15.5)
IMM GRANULOCYTES NFR BLD AUTO: 0.2 % — SIGNIFICANT CHANGE UP (ref 0–1.5)
LIDOCAIN IGE QN: 30 U/L — SIGNIFICANT CHANGE UP (ref 22–51)
LYMPHOCYTES # BLD AUTO: 1.9 K/UL — SIGNIFICANT CHANGE UP (ref 1–3.3)
LYMPHOCYTES # BLD AUTO: 18 % — SIGNIFICANT CHANGE UP (ref 13–44)
MCHC RBC-ENTMCNC: 30.3 PG — SIGNIFICANT CHANGE UP (ref 27–34)
MCHC RBC-ENTMCNC: 33 GM/DL — SIGNIFICANT CHANGE UP (ref 32–36)
MCV RBC AUTO: 91.9 FL — SIGNIFICANT CHANGE UP (ref 80–100)
MONOCYTES # BLD AUTO: 0.56 K/UL — SIGNIFICANT CHANGE UP (ref 0–0.9)
MONOCYTES NFR BLD AUTO: 5.3 % — SIGNIFICANT CHANGE UP (ref 2–14)
NEUTROPHILS # BLD AUTO: 7.62 K/UL — HIGH (ref 1.8–7.4)
NEUTROPHILS NFR BLD AUTO: 72.1 % — SIGNIFICANT CHANGE UP (ref 43–77)
PLATELET # BLD AUTO: 186 K/UL — SIGNIFICANT CHANGE UP (ref 150–400)
POTASSIUM SERPL-MCNC: 3.9 MMOL/L — SIGNIFICANT CHANGE UP (ref 3.5–5.3)
POTASSIUM SERPL-SCNC: 3.9 MMOL/L — SIGNIFICANT CHANGE UP (ref 3.5–5.3)
PROT SERPL-MCNC: 7.2 G/DL — SIGNIFICANT CHANGE UP (ref 6.6–8.7)
RBC # BLD: 4.42 M/UL — SIGNIFICANT CHANGE UP (ref 3.8–5.2)
RBC # FLD: 12.4 % — SIGNIFICANT CHANGE UP (ref 10.3–14.5)
SODIUM SERPL-SCNC: 137 MMOL/L — SIGNIFICANT CHANGE UP (ref 135–145)
WBC # BLD: 10.56 K/UL — HIGH (ref 3.8–10.5)
WBC # FLD AUTO: 10.56 K/UL — HIGH (ref 3.8–10.5)

## 2019-12-07 PROCEDURE — 99285 EMERGENCY DEPT VISIT HI MDM: CPT

## 2019-12-07 PROCEDURE — 74177 CT ABD & PELVIS W/CONTRAST: CPT | Mod: 26

## 2019-12-07 RX ORDER — SODIUM CHLORIDE 9 MG/ML
1000 INJECTION INTRAMUSCULAR; INTRAVENOUS; SUBCUTANEOUS ONCE
Refills: 0 | Status: COMPLETED | OUTPATIENT
Start: 2019-12-07 | End: 2019-12-07

## 2019-12-07 RX ORDER — ONDANSETRON 8 MG/1
4 TABLET, FILM COATED ORAL ONCE
Refills: 0 | Status: COMPLETED | OUTPATIENT
Start: 2019-12-07 | End: 2019-12-07

## 2019-12-07 RX ORDER — MORPHINE SULFATE 50 MG/1
4 CAPSULE, EXTENDED RELEASE ORAL ONCE
Refills: 0 | Status: DISCONTINUED | OUTPATIENT
Start: 2019-12-07 | End: 2019-12-07

## 2019-12-07 RX ORDER — KETOROLAC TROMETHAMINE 30 MG/ML
30 SYRINGE (ML) INJECTION ONCE
Refills: 0 | Status: DISCONTINUED | OUTPATIENT
Start: 2019-12-07 | End: 2019-12-07

## 2019-12-07 RX ADMIN — SODIUM CHLORIDE 1000 MILLILITER(S): 9 INJECTION INTRAMUSCULAR; INTRAVENOUS; SUBCUTANEOUS at 21:09

## 2019-12-07 RX ADMIN — MORPHINE SULFATE 4 MILLIGRAM(S): 50 CAPSULE, EXTENDED RELEASE ORAL at 21:08

## 2019-12-07 RX ADMIN — ONDANSETRON 4 MILLIGRAM(S): 8 TABLET, FILM COATED ORAL at 21:09

## 2019-12-07 RX ADMIN — Medication 30 MILLIGRAM(S): at 23:13

## 2019-12-07 NOTE — ED STATDOCS - PROGRESS NOTE DETAILS
PA NOTE: PT evaluated by intake physician. HPI/PE/ROS as noted above. Will follow up plan per intake physician PA NOTE: CT evidence of acute diverticulitis, w/o perf, sent abx to pharmacy, Pt reassessed, pt feeling better at this time, pain has improved, vss, pt able to walk, talk and vocalized plan of action. Discussed in depth the results and findings that were performed in the ED and explained to pt in depth the next steps that need to be taking including any medications and proper follow up with PCP or specialists. Pt verbalized their concerns and all questions were answered. Pt understands dispo and agrees with discharge.

## 2019-12-07 NOTE — ED STATDOCS - PATIENT PORTAL LINK FT
You can access the FollowMyHealth Patient Portal offered by [facility] by registering at the following website: [email or web address]. By joining [employer]’s FollowMyHealth portal, you will also be able to view your health information using other applications (apps) compatible with our system.

## 2019-12-07 NOTE — ED ADULT TRIAGE NOTE - CHIEF COMPLAINT QUOTE
Pt with hx of kidney stones and diverticulitis states worsening of lower abd pain of 1 week. Pt also states nausea and chills. Denies urinary sx's.

## 2019-12-07 NOTE — ED ADULT NURSE NOTE - NSIMPLEMENTINTERV_GEN_ALL_ED
Implemented All Universal Safety Interventions:  [place] to call system. Call bell, personal items and telephone within reach. Instruct patient to call for assistance. Room bathroom lighting operational. Non-slip footwear when patient is off stretcher. Physically safe environment: no spills, clutter or unnecessary equipment. Stretcher in lowest position, wheels locked, appropriate side rails in place.

## 2019-12-07 NOTE — ED STATDOCS - ATTENDING CONTRIBUTION TO CARE
I, Adair Kowalski, performed the initial face to face bedside interview with this patient regarding history of present illness, review of symptoms and relevant past medical, social and family history.  I completed an independent physical examination.  I was the initial provider who evaluated this patient. I have signed out the follow up of any pending tests (i.e. labs, radiological studies) to the ACP.  I have communicated the patient’s plan of care and disposition with the ACP.

## 2019-12-07 NOTE — ED ADULT TRIAGE NOTE - CADM TRG TX PRIOR TO ARRIVAL
Spoke with the patient and she would like the form sent to [email or web address]   none

## 2019-12-07 NOTE — ED ADULT NURSE NOTE - OBJECTIVE STATEMENT
Pt in no apparent distress at this time. Airway patent, breathing spontaneous and nonlabored, pulses present and palpable. pt A&Ox3, resting in stretcher. pt c/o abdominal pain worsening for 1 week. pt with PMH of kidney stones, states similar pain. Pt describes pain as intermittent, strong and sharp. pt denies any diarrhea, admits to vomiting and nausea.

## 2019-12-07 NOTE — ED STATDOCS - OBJECTIVE STATEMENT
63y/o F with PMHx of renal stones, Diverticulitis, Asthma, DM, DVT, GERD, HTN, HLD, MVP and TIA presents to the ED c/o flank pain, onset 1 week ago, worsening today radiating to b/l pelvic region. Assoc. sx of nausea. She reports that pain is currently a "20/10". Pt has h/o hydronephrosis with procedure intervention and stent placement needed due to prior stones, last intervention needed 1 year ago. Pt follows up with Urology every 6 months (Omer Col[facility]). Denies vomiting, fever, diarrhea, dysuria or hematuria. No additional complaints at this time.   Pshx: Tubal ligation, bladder sling, Hysterectomy, Coiled Brain Aneurysm.

## 2019-12-08 VITALS
DIASTOLIC BLOOD PRESSURE: 68 MMHG | TEMPERATURE: 99 F | OXYGEN SATURATION: 96 % | HEART RATE: 70 BPM | SYSTOLIC BLOOD PRESSURE: 150 MMHG | RESPIRATION RATE: 16 BRPM

## 2019-12-08 LAB
APPEARANCE UR: CLEAR — SIGNIFICANT CHANGE UP
BILIRUB UR-MCNC: NEGATIVE — SIGNIFICANT CHANGE UP
COLOR SPEC: SIGNIFICANT CHANGE UP
DIFF PNL FLD: NEGATIVE — SIGNIFICANT CHANGE UP
GLUCOSE UR QL: NEGATIVE MG/DL — SIGNIFICANT CHANGE UP
KETONES UR-MCNC: NEGATIVE — SIGNIFICANT CHANGE UP
LEUKOCYTE ESTERASE UR-ACNC: NEGATIVE — SIGNIFICANT CHANGE UP
NITRITE UR-MCNC: NEGATIVE — SIGNIFICANT CHANGE UP
PH UR: 7 — SIGNIFICANT CHANGE UP (ref 5–8)
PROT UR-MCNC: NEGATIVE MG/DL — SIGNIFICANT CHANGE UP
SP GR SPEC: 1 — LOW (ref 1.01–1.02)
UROBILINOGEN FLD QL: NEGATIVE MG/DL — SIGNIFICANT CHANGE UP

## 2019-12-08 PROCEDURE — 80053 COMPREHEN METABOLIC PANEL: CPT

## 2019-12-08 PROCEDURE — 74177 CT ABD & PELVIS W/CONTRAST: CPT

## 2019-12-08 PROCEDURE — 96375 TX/PRO/DX INJ NEW DRUG ADDON: CPT

## 2019-12-08 PROCEDURE — 83690 ASSAY OF LIPASE: CPT

## 2019-12-08 PROCEDURE — 96374 THER/PROPH/DIAG INJ IV PUSH: CPT | Mod: XU

## 2019-12-08 PROCEDURE — 81003 URINALYSIS AUTO W/O SCOPE: CPT

## 2019-12-08 PROCEDURE — 36415 COLL VENOUS BLD VENIPUNCTURE: CPT

## 2019-12-08 PROCEDURE — 99284 EMERGENCY DEPT VISIT MOD MDM: CPT | Mod: 25

## 2019-12-08 PROCEDURE — 87086 URINE CULTURE/COLONY COUNT: CPT

## 2019-12-08 PROCEDURE — 85027 COMPLETE CBC AUTOMATED: CPT

## 2019-12-08 RX ADMIN — Medication 1 TABLET(S): at 00:53

## 2019-12-09 LAB
CULTURE RESULTS: SIGNIFICANT CHANGE UP
SPECIMEN SOURCE: SIGNIFICANT CHANGE UP

## 2020-02-24 ENCOUNTER — OUTPATIENT (OUTPATIENT)
Dept: OUTPATIENT SERVICES | Facility: HOSPITAL | Age: 65
LOS: 1 days | Discharge: HOME | End: 2020-02-24

## 2020-02-24 VITALS
HEIGHT: 64 IN | WEIGHT: 190.92 LBS | SYSTOLIC BLOOD PRESSURE: 137 MMHG | HEART RATE: 68 BPM | OXYGEN SATURATION: 100 % | RESPIRATION RATE: 19 BRPM | TEMPERATURE: 97 F | DIASTOLIC BLOOD PRESSURE: 71 MMHG

## 2020-02-24 DIAGNOSIS — Z12.11 ENCOUNTER FOR SCREENING FOR MALIGNANT NEOPLASM OF COLON: ICD-10-CM

## 2020-02-24 DIAGNOSIS — Z98.49 CATARACT EXTRACTION STATUS, UNSPECIFIED EYE: Chronic | ICD-10-CM

## 2020-02-24 DIAGNOSIS — Z98.89 OTHER SPECIFIED POSTPROCEDURAL STATES: Chronic | ICD-10-CM

## 2020-02-27 DIAGNOSIS — K64.8 OTHER HEMORRHOIDS: ICD-10-CM

## 2020-02-27 DIAGNOSIS — K57.30 DIVERTICULOSIS OF LARGE INTESTINE WITHOUT PERFORATION OR ABSCESS WITHOUT BLEEDING: ICD-10-CM

## 2020-02-27 DIAGNOSIS — Z90.710 ACQUIRED ABSENCE OF BOTH CERVIX AND UTERUS: ICD-10-CM

## 2020-02-27 DIAGNOSIS — Z79.84 LONG TERM (CURRENT) USE OF ORAL HYPOGLYCEMIC DRUGS: ICD-10-CM

## 2020-02-27 DIAGNOSIS — K59.00 CONSTIPATION, UNSPECIFIED: ICD-10-CM

## 2020-02-27 DIAGNOSIS — Z83.71 FAMILY HISTORY OF COLONIC POLYPS: ICD-10-CM

## 2020-02-27 DIAGNOSIS — Z87.01 PERSONAL HISTORY OF PNEUMONIA (RECURRENT): ICD-10-CM

## 2020-02-27 DIAGNOSIS — J44.9 CHRONIC OBSTRUCTIVE PULMONARY DISEASE, UNSPECIFIED: ICD-10-CM

## 2020-02-27 DIAGNOSIS — M19.90 UNSPECIFIED OSTEOARTHRITIS, UNSPECIFIED SITE: ICD-10-CM

## 2020-02-27 DIAGNOSIS — Z86.73 PERSONAL HISTORY OF TRANSIENT ISCHEMIC ATTACK (TIA), AND CEREBRAL INFARCTION WITHOUT RESIDUAL DEFICITS: ICD-10-CM

## 2020-02-27 DIAGNOSIS — E78.00 PURE HYPERCHOLESTEROLEMIA, UNSPECIFIED: ICD-10-CM

## 2020-02-27 DIAGNOSIS — Z88.0 ALLERGY STATUS TO PENICILLIN: ICD-10-CM

## 2020-02-27 DIAGNOSIS — I25.10 ATHEROSCLEROTIC HEART DISEASE OF NATIVE CORONARY ARTERY WITHOUT ANGINA PECTORIS: ICD-10-CM

## 2020-02-27 DIAGNOSIS — R73.03 PREDIABETES: ICD-10-CM

## 2020-02-27 DIAGNOSIS — Z86.010 PERSONAL HISTORY OF COLONIC POLYPS: ICD-10-CM

## 2020-02-28 DIAGNOSIS — Z12.11 ENCOUNTER FOR SCREENING FOR MALIGNANT NEOPLASM OF COLON: ICD-10-CM

## 2020-03-04 ENCOUNTER — APPOINTMENT (OUTPATIENT)
Dept: CT IMAGING | Facility: CLINIC | Age: 65
End: 2020-03-04
Payer: MEDICARE

## 2020-03-04 ENCOUNTER — OUTPATIENT (OUTPATIENT)
Dept: OUTPATIENT SERVICES | Facility: HOSPITAL | Age: 65
LOS: 1 days | End: 2020-03-04
Payer: MEDICARE

## 2020-03-04 DIAGNOSIS — Z98.49 CATARACT EXTRACTION STATUS, UNSPECIFIED EYE: Chronic | ICD-10-CM

## 2020-03-04 DIAGNOSIS — Z00.00 ENCOUNTER FOR GENERAL ADULT MEDICAL EXAMINATION WITHOUT ABNORMAL FINDINGS: ICD-10-CM

## 2020-03-04 DIAGNOSIS — Z12.11 ENCOUNTER FOR SCREENING FOR MALIGNANT NEOPLASM OF COLON: ICD-10-CM

## 2020-03-04 DIAGNOSIS — Z98.89 OTHER SPECIFIED POSTPROCEDURAL STATES: Chronic | ICD-10-CM

## 2020-03-04 PROCEDURE — 74261 CT COLONOGRAPHY DX: CPT

## 2020-03-04 PROCEDURE — 74261 CT COLONOGRAPHY DX: CPT | Mod: 26

## 2020-05-22 ENCOUNTER — TRANSCRIPTION ENCOUNTER (OUTPATIENT)
Age: 65
End: 2020-05-22

## 2020-06-17 ENCOUNTER — APPOINTMENT (OUTPATIENT)
Dept: UROLOGY | Facility: CLINIC | Age: 65
End: 2020-06-17

## 2020-09-21 ENCOUNTER — APPOINTMENT (OUTPATIENT)
Dept: NEUROSURGERY | Facility: CLINIC | Age: 65
End: 2020-09-21
Payer: MEDICARE

## 2020-09-21 VITALS — BODY MASS INDEX: 31.65 KG/M2 | HEIGHT: 65 IN | WEIGHT: 190 LBS

## 2020-09-21 DIAGNOSIS — I67.1 CEREBRAL ANEURYSM, NONRUPTURED: ICD-10-CM

## 2020-09-21 DIAGNOSIS — Z87.891 PERSONAL HISTORY OF NICOTINE DEPENDENCE: ICD-10-CM

## 2020-09-21 DIAGNOSIS — I60.7 NONTRAUMATIC SUBARACHNOID HEMORRHAGE FROM UNSPECIFIED INTRACRANIAL ARTERY: ICD-10-CM

## 2020-09-21 DIAGNOSIS — R51 HEADACHE: ICD-10-CM

## 2020-09-21 PROCEDURE — 99443: CPT | Mod: 95

## 2020-09-21 NOTE — HISTORY OF PRESENT ILLNESS
[Home] : at home, [unfilled] , at the time of the visit. [Medical Office: ([facility])___] : at the medical office located in  [Verbal consent obtained from patient] : the patient, [unfilled] [Other:____] : [unfilled] [FreeTextEntry1] : Sunshine Castrejon is a pleasant 65 year old right handed lady with history of aneurysmal SAH and stroke who presents for consultation regarding new headaches for 2 weeks.\par \par In 2008 she had aneurysmal SAH from LEFT PICA aneurysm ([place]). This was treated with coils and complicated by stroke. The aneurysm recurred and I treated it with flow diverter stent in June 2020. This was an uneventful procedure. She remains on aspirin and Brillinta.\par \par Patient is concerned because she has been having sharp pains behind left ear during the past 2 weeks.  She describes pain is sharp and intense 10/10 that lasts for about 5 minutes to 1 hour. She also reports a numbing sensation to left face and muffled sound in left ear.  \par \par Of note, history of cervical fusion.\par \par \par She takes Tylenol prn without relief.  Headaches are associated with blurry vision.\par Telehealth 1:33 pm - 1:43 pm.\par Currently taking Brilinta 90 mg twice daily and ASA 81 mg twice daily.

## 2020-09-21 NOTE — ASSESSMENT
[FreeTextEntry1] : IMPRESSION:\par New headaches in upper neck and LEFT occipital area. Episodic. No mental status changes. No vision changes.\par I reassured her that there are not likely related to the aneurysm, but I recommended MRI and MRA for evaluation.\par We discussed symptoms of stroke that should prompt ED admission.\par \par PLAN:\par 1. MRA and MRV brain w/wo contrast.\par 2. Follow-up after the imaging

## 2020-09-21 NOTE — PHYSICAL EXAM
[General Appearance - Alert] : alert [General Appearance - In No Acute Distress] : in no acute distress [General Appearance - Well Nourished] : well nourished [General Appearance - Well Developed] : well developed [General Appearance - Well-Appearing] : healthy appearing [Oriented To Time, Place, And Person] : oriented to person, place, and time [Impaired Insight] : insight and judgment were intact [Affect] : the affect was normal [Memory Recent] : recent memory was not impaired [Person] : oriented to person [Place] : oriented to place [Time] : oriented to time [] : no respiratory distress [Exaggerated Use Of Accessory Muscles For Inspiration] : no accessory muscle use

## 2020-10-06 ENCOUNTER — APPOINTMENT (OUTPATIENT)
Dept: MRI IMAGING | Facility: CLINIC | Age: 65
End: 2020-10-06
Payer: MEDICARE

## 2020-10-06 ENCOUNTER — OUTPATIENT (OUTPATIENT)
Dept: OUTPATIENT SERVICES | Facility: HOSPITAL | Age: 65
LOS: 1 days | End: 2020-10-06

## 2020-10-06 ENCOUNTER — RESULT REVIEW (OUTPATIENT)
Age: 65
End: 2020-10-06

## 2020-10-06 DIAGNOSIS — Z98.89 OTHER SPECIFIED POSTPROCEDURAL STATES: Chronic | ICD-10-CM

## 2020-10-06 DIAGNOSIS — Z98.49 CATARACT EXTRACTION STATUS, UNSPECIFIED EYE: Chronic | ICD-10-CM

## 2020-10-06 DIAGNOSIS — R51.0 HEADACHE WITH ORTHOSTATIC COMPONENT, NOT ELSEWHERE CLASSIFIED: ICD-10-CM

## 2020-10-06 PROCEDURE — 70546 MR ANGIOGRAPH HEAD W/O&W/DYE: CPT | Mod: 26,59

## 2020-10-06 PROCEDURE — 70553 MRI BRAIN STEM W/O & W/DYE: CPT | Mod: 26

## 2020-12-21 ENCOUNTER — APPOINTMENT (OUTPATIENT)
Dept: NEUROLOGY | Facility: CLINIC | Age: 65
End: 2020-12-21

## 2021-01-18 NOTE — ASU DISCHARGE PLAN (ADULT/PEDIATRIC). - SPECIAL INSTRUCTIONS
The patient is a 51y Female complaining of back pain general.
Please refer to pre-printed MD instructions. Apply ice for 20 minutes several times per day for the next 24-48 hours, then as needed for comfort. .. Narcotic pain medication may cause nausea or constipation. Take medication with food. Increase fluids and fiber intake.

## 2021-02-04 ENCOUNTER — APPOINTMENT (OUTPATIENT)
Dept: MRI IMAGING | Facility: CLINIC | Age: 66
End: 2021-02-04
Payer: MEDICARE

## 2021-02-04 ENCOUNTER — RESULT REVIEW (OUTPATIENT)
Age: 66
End: 2021-02-04

## 2021-02-04 ENCOUNTER — OUTPATIENT (OUTPATIENT)
Dept: OUTPATIENT SERVICES | Facility: HOSPITAL | Age: 66
LOS: 1 days | End: 2021-02-04
Payer: MEDICARE

## 2021-02-04 DIAGNOSIS — Z98.89 OTHER SPECIFIED POSTPROCEDURAL STATES: Chronic | ICD-10-CM

## 2021-02-04 DIAGNOSIS — I67.1 CEREBRAL ANEURYSM, NONRUPTURED: ICD-10-CM

## 2021-02-04 DIAGNOSIS — Z98.49 CATARACT EXTRACTION STATUS, UNSPECIFIED EYE: Chronic | ICD-10-CM

## 2021-02-04 PROCEDURE — A9585: CPT

## 2021-02-04 PROCEDURE — 70546 MR ANGIOGRAPH HEAD W/O&W/DYE: CPT | Mod: 26,ME

## 2021-02-04 PROCEDURE — G1004: CPT

## 2021-02-04 PROCEDURE — 70546 MR ANGIOGRAPH HEAD W/O&W/DYE: CPT

## 2021-02-10 ENCOUNTER — TRANSCRIPTION ENCOUNTER (OUTPATIENT)
Age: 66
End: 2021-02-10

## 2021-03-08 ENCOUNTER — APPOINTMENT (OUTPATIENT)
Dept: NEUROSURGERY | Facility: CLINIC | Age: 66
End: 2021-03-08
Payer: MEDICARE

## 2021-03-08 VITALS — WEIGHT: 181 LBS | BODY MASS INDEX: 30.16 KG/M2 | HEIGHT: 65 IN

## 2021-03-08 PROCEDURE — 99213 OFFICE O/P EST LOW 20 MIN: CPT | Mod: 95

## 2021-03-08 RX ORDER — EMPAGLIFLOZIN AND METFORMIN HYDROCHLORIDE 12.5; 1 MG/1; MG/1
TABLET ORAL
Refills: 0 | Status: ACTIVE | COMMUNITY

## 2021-03-08 RX ORDER — RANITIDINE 300 MG/1
300 TABLET ORAL
Qty: 30 | Refills: 1 | Status: DISCONTINUED | COMMUNITY
Start: 2017-11-14 | End: 2021-03-08

## 2021-03-08 RX ORDER — ESZOPICLONE 3 MG/1
3 TABLET, FILM COATED ORAL
Qty: 30 | Refills: 1 | Status: DISCONTINUED | COMMUNITY
Start: 2017-09-06 | End: 2021-03-08

## 2021-03-08 RX ORDER — FLUTICASONE PROPIONATE AND SALMETEROL 50; 500 UG/1; UG/1
500-50 POWDER RESPIRATORY (INHALATION)
Qty: 60 | Refills: 3 | Status: DISCONTINUED | COMMUNITY
Start: 2017-11-14 | End: 2021-03-08

## 2021-03-08 RX ORDER — ESZOPICLONE 3 MG/1
3 TABLET, FILM COATED ORAL
Qty: 30 | Refills: 0 | Status: DISCONTINUED | COMMUNITY
Start: 2017-10-17 | End: 2021-03-08

## 2021-03-08 NOTE — REASON FOR VISIT
[Home] : at home, [unfilled] , at the time of the visit. [Medical Office: ([facility])___] : at the medical office located in  [Verbal consent obtained from patient] : the patient, [unfilled] [Follow-Up: _____] : a [unfilled] follow-up visit

## 2021-03-09 NOTE — DATA REVIEWED
[de-identified] : \par \par EXAM: MR ANGIO BRAIN WAW IC\par \par \par PROCEDURE DATE: 02/04/2021\par \par \par \par INTERPRETATION: Clinical indications: Follow-up aneurysm.\par \par MRA of the [place] was performed using 3-D [place] technique. The patient was injected with approximately 8 cc Gadavist IV gadolinium infusion MRA of the [place] was performed.\par \par Stent involving the distal left vertebral artery is again suspected. Artifact from embolic material involving the left PICA region is again seen. Small focus of flow related signal enhancement in the left PICA region (adjacent to the embolic material) is identified. This finding measures approximately 5.2 x 4.2 mm and previously measured approximately 6.3 x 4.0 mm. This is likely compatible with residual or recurrent aneurysm.\par \par Evaluation of the distal right vertebral artery appears normal. Both distal internal carotid, anterior cerebral, middle cerebral, basilar and posterior cerebral arteries appear normal. No significant stenosis is seen.\par \par IMPRESSION: Abnormal flow related signal enhancement in the left PICA region is again seen.\par \par \par \par \par \par \par TOMY PARRA M.D., ATTENDING RADIOLOGIST\par This document has been electronically signed. Feb 5 2021 9:30AM

## 2021-03-09 NOTE — ASSESSMENT
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 Has not seen Neurologist.\par \par Recommended catheter cerebral angiogram for 1 year follow-up.\par \par I recommended catheter cerebral angiogram for further evaluation. \par \par The risks, benefits and alternatives of catheter angiogram were discussed with the patient in detail. In my personal experience, the risks are very rare, but the possibility is not zero. Risks include stroke, brain hemorrhage, any type of disability (facial or extremity weakness, facial or extremity numbness, speech difficulties, blindness) and others. There are also possible complications related to the incisions such as infection, pain, swelling and bleeding.\par  \par PLAN:\par Catheter cerebral angiogram JUN 2021 for routine 1-year follow-up\par

## 2021-03-09 NOTE — HISTORY OF PRESENT ILLNESS
[FreeTextEntry1] : Sunshine Castrejon is a pleasant 66 year old right handed lady with history of aneurysmal SAH and stroke who presents for follow up and MRA review.\par \par In 2008 she had aneurysmal SAH from LEFT PICA aneurysm ([place]). This was treated with coils and complicated by stroke. The aneurysm recurred and I treated it with flow diverter stent in June 2020. This was an uneventful procedure.\par \par Patient is concerned because she has been having sharp pains behind left ear during the past 2 weeks. She describes pain is sharp and intense 10/10 that lasts for about 5 minutes to 1 hour. She also reports a numbing sensation to left face and muffled sound in left ear. \par \par She takes Tylenol prn without relief. Headaches are associated with blurry vision.\par \par She remains compliant with Brilinta 90 mg twice daily and ASA 81mg daily.

## 2021-03-16 ENCOUNTER — NON-APPOINTMENT (OUTPATIENT)
Age: 66
End: 2021-03-16

## 2021-03-16 ENCOUNTER — TRANSCRIPTION ENCOUNTER (OUTPATIENT)
Age: 66
End: 2021-03-16

## 2021-04-08 ENCOUNTER — APPOINTMENT (OUTPATIENT)
Dept: NEUROLOGY | Facility: CLINIC | Age: 66
End: 2021-04-08

## 2021-04-08 NOTE — H&P PST ADULT - MUSCULOSKELETAL COMMENTS
Discharge Instructions for General Surgery Patients       1. Do not lift any objects weighing more than 15 pounds for 2 weeks. 2. Do not do any housework including vacuuming, scrubbing or yardwork for 4 weeks. 3. Do not drive or operate machinery while taking sedating or narcotic medications. 4. Post operative pain is expected. Try to wean off narcotics as soon as able and take tylenol or NSAIDS. Do not take tylenol with Norco or Percocet as this may harm your liver. No NSAIDS if you are bariatric surgery patient. 5. You may walk as desired and go up and down stairs as needed. Walking is encouraged. 6. You may shower the day after surgery. Do not take tub baths, swim or use hot tubs for 2 weeks. Pat dry wounds after with a towel. 7. Leave glue on wounds. It will fall off with time. Do not scrub around incisions. If redness develops around the glue okay to peel off in hot shower. 8. Regular diet. Take Miralax once or twice a day as needed for constipation. 9. Follow up with provider as scheduled. 10. If you experience fever (greater than 101.5), chills, vomiting or redness or drainage at surgical site, please contact your surgeons office. If you have further questions or concerns, please call your surgeons office at [phone].        ______________________________________________________________________    Anesthesia Discharge Instructions    After general anesthesia or intervenous sedation, for 24 hours or while taking prescription Narcotics:  · Limit your activities  · Do not drive or operate hazardous machinery  · If you have not urinated within 8 hours after discharge, please contact your surgeon on call.   · Do not make important personal or business decisions  · Do not drink alcoholic beverages    Report the following to your surgeon:  · Excessive pain, swelling, redness or odor of or around the surgical area  · Temperature over 100.5 degrees  · Nausea and vomiting lasting longer than 4 hours or if unable to take medication  · Any signs of decreased circulation or nerve impairment to extremity:  Change in color, persistent numbness, tingling, coldness or increased pain.   · Any questions Limited cervical neck mobility with Hx of fusion.

## 2021-04-09 ENCOUNTER — APPOINTMENT (OUTPATIENT)
Dept: DERMATOLOGY | Facility: CLINIC | Age: 66
End: 2021-04-09
Payer: MEDICARE

## 2021-04-09 PROCEDURE — 99203 OFFICE O/P NEW LOW 30 MIN: CPT

## 2021-04-29 ENCOUNTER — EMERGENCY (EMERGENCY)
Facility: HOSPITAL | Age: 66
LOS: 1 days | Discharge: HOME | End: 2021-04-29
Attending: EMERGENCY MEDICINE
Payer: MEDICARE

## 2021-04-29 VITALS
DIASTOLIC BLOOD PRESSURE: 84 MMHG | SYSTOLIC BLOOD PRESSURE: 184 MMHG | TEMPERATURE: 98 F | OXYGEN SATURATION: 99 % | RESPIRATION RATE: 18 BRPM | HEART RATE: 78 BPM | HEIGHT: 64 IN

## 2021-04-29 DIAGNOSIS — Z98.49 CATARACT EXTRACTION STATUS, UNSPECIFIED EYE: Chronic | ICD-10-CM

## 2021-04-29 DIAGNOSIS — Z98.89 OTHER SPECIFIED POSTPROCEDURAL STATES: Chronic | ICD-10-CM

## 2021-04-29 LAB
ALBUMIN SERPL ELPH-MCNC: 4.2 G/DL — SIGNIFICANT CHANGE UP (ref 3.3–5)
ALP SERPL-CCNC: 96 U/L — SIGNIFICANT CHANGE UP (ref 40–120)
ALT FLD-CCNC: 18 U/L — SIGNIFICANT CHANGE UP (ref 10–45)
ANION GAP SERPL CALC-SCNC: 13 MMOL/L — SIGNIFICANT CHANGE UP (ref 5–17)
APTT BLD: 23.4 SEC — LOW (ref 27.5–35.5)
AST SERPL-CCNC: 21 U/L — SIGNIFICANT CHANGE UP (ref 10–40)
BASOPHILS # BLD AUTO: 0.06 K/UL — SIGNIFICANT CHANGE UP (ref 0–0.2)
BASOPHILS NFR BLD AUTO: 0.6 % — SIGNIFICANT CHANGE UP (ref 0–2)
BILIRUB SERPL-MCNC: 0.4 MG/DL — SIGNIFICANT CHANGE UP (ref 0.2–1.2)
BUN SERPL-MCNC: 11 MG/DL — SIGNIFICANT CHANGE UP (ref 7–23)
CALCIUM SERPL-MCNC: 10 MG/DL — SIGNIFICANT CHANGE UP (ref 8.4–10.5)
CHLORIDE SERPL-SCNC: 104 MMOL/L — SIGNIFICANT CHANGE UP (ref 96–108)
CO2 SERPL-SCNC: 22 MMOL/L — SIGNIFICANT CHANGE UP (ref 22–31)
CREAT SERPL-MCNC: 0.6 MG/DL — SIGNIFICANT CHANGE UP (ref 0.5–1.3)
EOSINOPHIL # BLD AUTO: 0.48 K/UL — SIGNIFICANT CHANGE UP (ref 0–0.5)
EOSINOPHIL NFR BLD AUTO: 5.1 % — SIGNIFICANT CHANGE UP (ref 0–6)
GLUCOSE SERPL-MCNC: 116 MG/DL — HIGH (ref 70–99)
HCT VFR BLD CALC: 46.6 % — HIGH (ref 34.5–45)
HGB BLD-MCNC: 15 G/DL — SIGNIFICANT CHANGE UP (ref 11.5–15.5)
IMM GRANULOCYTES NFR BLD AUTO: 0.4 % — SIGNIFICANT CHANGE UP (ref 0–1.5)
INR BLD: 1 RATIO — SIGNIFICANT CHANGE UP (ref 0.88–1.16)
LYMPHOCYTES # BLD AUTO: 1.55 K/UL — SIGNIFICANT CHANGE UP (ref 1–3.3)
LYMPHOCYTES # BLD AUTO: 16.3 % — SIGNIFICANT CHANGE UP (ref 13–44)
MCHC RBC-ENTMCNC: 29.6 PG — SIGNIFICANT CHANGE UP (ref 27–34)
MCHC RBC-ENTMCNC: 32.2 GM/DL — SIGNIFICANT CHANGE UP (ref 32–36)
MCV RBC AUTO: 91.9 FL — SIGNIFICANT CHANGE UP (ref 80–100)
MONOCYTES # BLD AUTO: 0.66 K/UL — SIGNIFICANT CHANGE UP (ref 0–0.9)
MONOCYTES NFR BLD AUTO: 6.9 % — SIGNIFICANT CHANGE UP (ref 2–14)
NEUTROPHILS # BLD AUTO: 6.71 K/UL — SIGNIFICANT CHANGE UP (ref 1.8–7.4)
NEUTROPHILS NFR BLD AUTO: 70.7 % — SIGNIFICANT CHANGE UP (ref 43–77)
NRBC # BLD: 0 /100 WBCS — SIGNIFICANT CHANGE UP (ref 0–0)
PLATELET # BLD AUTO: 209 K/UL — SIGNIFICANT CHANGE UP (ref 150–400)
POTASSIUM SERPL-MCNC: 4 MMOL/L — SIGNIFICANT CHANGE UP (ref 3.5–5.3)
POTASSIUM SERPL-SCNC: 4 MMOL/L — SIGNIFICANT CHANGE UP (ref 3.5–5.3)
PROT SERPL-MCNC: 7.3 G/DL — SIGNIFICANT CHANGE UP (ref 6–8.3)
PROTHROM AB SERPL-ACNC: 12 SEC — SIGNIFICANT CHANGE UP (ref 10.6–13.6)
RBC # BLD: 5.07 M/UL — SIGNIFICANT CHANGE UP (ref 3.8–5.2)
RBC # FLD: 13.8 % — SIGNIFICANT CHANGE UP (ref 10.3–14.5)
SARS-COV-2 RNA SPEC QL NAA+PROBE: SIGNIFICANT CHANGE UP
SODIUM SERPL-SCNC: 139 MMOL/L — SIGNIFICANT CHANGE UP (ref 135–145)
TROPONIN T, HIGH SENSITIVITY RESULT: <6 NG/L — SIGNIFICANT CHANGE UP (ref 0–51)
WBC # BLD: 9.5 K/UL — SIGNIFICANT CHANGE UP (ref 3.8–10.5)
WBC # FLD AUTO: 9.5 K/UL — SIGNIFICANT CHANGE UP (ref 3.8–10.5)

## 2021-04-29 PROCEDURE — 70496 CT ANGIOGRAPHY HEAD: CPT | Mod: 26,MA

## 2021-04-29 PROCEDURE — 99220: CPT | Mod: CS,GC

## 2021-04-29 PROCEDURE — 70450 CT HEAD/BRAIN W/O DYE: CPT | Mod: 26,59,MA

## 2021-04-29 PROCEDURE — 70498 CT ANGIOGRAPHY NECK: CPT | Mod: 26,MA

## 2021-04-29 RX ORDER — MECLIZINE HCL 12.5 MG
25 TABLET ORAL ONCE
Refills: 0 | Status: COMPLETED | OUTPATIENT
Start: 2021-04-29 | End: 2021-04-29

## 2021-04-29 RX ORDER — TICAGRELOR 90 MG/1
90 TABLET ORAL ONCE
Refills: 0 | Status: COMPLETED | OUTPATIENT
Start: 2021-04-29 | End: 2021-04-29

## 2021-04-29 RX ORDER — MONTELUKAST 4 MG/1
10 TABLET, CHEWABLE ORAL ONCE
Refills: 0 | Status: COMPLETED | OUTPATIENT
Start: 2021-04-29 | End: 2021-04-29

## 2021-04-29 RX ORDER — ACETAMINOPHEN 500 MG
975 TABLET ORAL ONCE
Refills: 0 | Status: COMPLETED | OUTPATIENT
Start: 2021-04-29 | End: 2021-04-29

## 2021-04-29 RX ORDER — METOCLOPRAMIDE HCL 10 MG
10 TABLET ORAL ONCE
Refills: 0 | Status: COMPLETED | OUTPATIENT
Start: 2021-04-29 | End: 2021-04-29

## 2021-04-29 RX ORDER — MECLIZINE HCL 12.5 MG
12.5 TABLET ORAL ONCE
Refills: 0 | Status: COMPLETED | OUTPATIENT
Start: 2021-04-29 | End: 2021-04-29

## 2021-04-29 RX ORDER — ROPINIROLE 8 MG/1
0.5 TABLET, FILM COATED, EXTENDED RELEASE ORAL ONCE
Refills: 0 | Status: COMPLETED | OUTPATIENT
Start: 2021-04-29 | End: 2021-04-29

## 2021-04-29 RX ORDER — SIMVASTATIN 20 MG/1
20 TABLET, FILM COATED ORAL AT BEDTIME
Refills: 0 | Status: DISCONTINUED | OUTPATIENT
Start: 2021-04-29 | End: 2021-05-03

## 2021-04-29 RX ORDER — SODIUM CHLORIDE 9 MG/ML
1000 INJECTION INTRAMUSCULAR; INTRAVENOUS; SUBCUTANEOUS ONCE
Refills: 0 | Status: COMPLETED | OUTPATIENT
Start: 2021-04-29 | End: 2021-04-29

## 2021-04-29 RX ADMIN — Medication 975 MILLIGRAM(S): at 23:02

## 2021-04-29 RX ADMIN — Medication 25 MILLIGRAM(S): at 23:06

## 2021-04-29 RX ADMIN — Medication 975 MILLIGRAM(S): at 23:26

## 2021-04-29 RX ADMIN — SODIUM CHLORIDE 1000 MILLILITER(S): 9 INJECTION INTRAMUSCULAR; INTRAVENOUS; SUBCUTANEOUS at 23:06

## 2021-04-29 RX ADMIN — Medication 10 MILLIGRAM(S): at 19:40

## 2021-04-29 RX ADMIN — MONTELUKAST 10 MILLIGRAM(S): 4 TABLET, CHEWABLE ORAL at 23:06

## 2021-04-29 RX ADMIN — Medication 12.5 MILLIGRAM(S): at 17:35

## 2021-04-29 RX ADMIN — TICAGRELOR 90 MILLIGRAM(S): 90 TABLET ORAL at 23:02

## 2021-04-29 RX ADMIN — ROPINIROLE 0.5 MILLIGRAM(S): 8 TABLET, FILM COATED, EXTENDED RELEASE ORAL at 23:06

## 2021-04-29 RX ADMIN — SIMVASTATIN 20 MILLIGRAM(S): 20 TABLET, FILM COATED ORAL at 23:06

## 2021-04-29 NOTE — ED CDU PROVIDER DISPOSITION NOTE - PATIENT PORTAL LINK FT
You can access the FollowMyHealth Patient Portal offered by [facility] by registering at the following website: [email or web address]. By joining [employer]’s FollowMyHealth portal, you will also be able to view your health information using other applications (apps) compatible with our system.

## 2021-04-29 NOTE — ED CDU PROVIDER DISPOSITION NOTE - ATTENDING CONTRIBUTION TO CARE
Patient is a 65 yo F with history of spinal stenosis, cervical fusion, asthma, HTN, TIA, ruptured PICA aneurysm leading to subarachnoid hemorrhage s/p coiling and flow diverting stent with Dr. Deleon, who presented with vertigo. Patient woke up with these symptoms. She reports symptoms are intermittent, associated with nausea. She was a code stroke, negative CTA H&N. Neuro exam nonfocal. In CDU for MRI Brain. Of note, she had an MRA done in February 2021 which did show that there was abnormal flow in the L PICA and was contacted to be scheduled for a conventional angiogram in June.    VS noted  Gen. no acute distress, Non toxic   HEENT: EOMI, mmm  Lungs: CTAB/L no C/ W /R   CVS: RRR   Abd; Soft non tender, non distended   Ext: no edema  Skin: no rash  Neuro AAOx3 non focal clear speech  a/p: dizziness - MRI is negative for acute pathology. She was seen by neurology and neurosurgery and cleared for discharge. DC with Meclizine.  - Mariela DIXON

## 2021-04-29 NOTE — ED CDU PROVIDER SUBSEQUENT DAY NOTE - ATTENDING CONTRIBUTION TO CARE
Patient is a 65 yo F with history of spinal stenosis, cervical fusion, asthma, HTN, TIA, ruptured PICA aneurysm leading to subarachnoid hemorrhage s/p coiling and flow diverting stent with Dr. Deleon, who presented with vertigo. Patient woke up with these symptoms. She reports symptoms are intermittent, associated with nausea. She was a code stroke, negative CTA H&N. Neuro exam nonfocal. In CDU for MRI Brain. Of note, she had an MRA done in February 2021 which did show that there was abnormal flow in the L PICA and was contacted to be scheduled for a conventional angiogram in June.    VS noted  Gen. no acute distress, Non toxic   HEENT: EOMI, mmm  Lungs: CTAB/L no C/ W /R   CVS: RRR   Abd; Soft non tender, non distended   Ext: no edema  Skin: no rash  Neuro AAOx3 non focal clear speech  a/p: dizziness - patient being followed by neurology. She is in CDU for MRI Brain.   - Mariela DIXON

## 2021-04-29 NOTE — ED CDU PROVIDER SUBSEQUENT DAY NOTE - HISTORY
CDU PROGRESS NOTE LATASHA BOYER: No interval change. pts denies dizziness at this time. neuro exam grossly intact. NAD. VSS. no events on tele. Will continue to monitor.

## 2021-04-29 NOTE — ED ADULT NURSE REASSESSMENT NOTE - NS ED NURSE REASSESS COMMENT FT1
Patient ambulated to the bathroom with steady gait; stand by assist only however when patient got back to bed, she said she felt very dizzy. Dr Gómez aware and will order patient additional medication.

## 2021-04-29 NOTE — CONSULT NOTE ADULT - ATTENDING COMMENTS
Briefly 67 yo RH F with SS, HTN< TIA, PICA aneurysm --> SAH s/p coiling and flow diverting stent p/w dizziness. CTH and CTA H/N unremarkable with chronic findings. MRI w/o acute infarcts f/u official read   can c/w home meds  f/u with me in office in 1-2 weeks and dr. pierre from neuro IR  spoke with CDU team LATASHA Morales

## 2021-04-29 NOTE — ED PROVIDER NOTE - NS ED ROS FT
REVIEW OF SYSTEMS:  General: +dizziness, no fever, no chills  HEENT: no headache, no vision changes  Cardiac: no chest pain, no palpitations  Respiratory: no cough, no shortness of breath  Gastrointestinal: no abdominal pain, no nausea, no vomiting, no diarrhea  Genitourinary: no hematuria, no dysuria, no urinary frequency  Extremities: no extremity swelling, no extremity pain  Neuro: no focal weakness, no numbness/tingling of the extremities, no decreased sensation  Heme: no easy bleeding, no easy bruising  Skin: no jaundice,  no rashes, no lesions  All other ROS as documented in HPI  -Adair Gómez, PGY-3

## 2021-04-29 NOTE — ED CDU PROVIDER INITIAL DAY NOTE - PHYSICAL EXAMINATION
General: Well developed, well nourished  HEENT: Normocephalic and atraumatic, EOMI, Trachea midline.   Cardiac: Normal S1 and S2 w/ RRR. No MRG.  Pulmonary: CTA bilaterally. No increased WOB.   Abdominal: Soft, NTND  Neurologic: +fatigable left horizontal nystagmus. CN II-XII otherwise intact. Muscle strength 5/5 in all extremities. Negative pronator drift.   Musculoskeletal: No limited ROM.  Vascular: Warm and well perfused  Skin: Color appropriate for race.   Psychiatric: Appropriate mood and affect. No apparent risk to self or others.

## 2021-04-29 NOTE — ED CDU PROVIDER INITIAL DAY NOTE - OBJECTIVE STATEMENT
66F PMH spinal stenosis, cervical fusion, asthma, ruptured brain aneurysm, HTN, TIA presents with dizziness. Pt states this morning was woken up by dizziness when she was laying in bed. States intermittent, lasting 30 seconds to a minute. Has occurred today while sitting and standing but not explicity provoked by head movement. No recent illnesses, last known normal last night.  In ED all labs and imaging were nonactionable. pt was seen by neurology who recommended MRI w/o, asa/brilinta, permissive HTN, neurosurgery eval

## 2021-04-29 NOTE — ED CDU PROVIDER INITIAL DAY NOTE - ATTENDING CONTRIBUTION TO CARE
I Rolando Garcia MD have personally performed a face to face diagnostic evaluation on this patient.  I have reviewed the ACP note and agree with the history, exam, and plan of care, except as noted.   My medical decison making and observations are found above.  The patient is stable for CDU.  Lungs clear, nl speech, moving all 4

## 2021-04-29 NOTE — ED ADULT NURSE NOTE - OBJECTIVE STATEMENT
67yo female PMHX COPD, HTN, DMII, HLD, BRAIN ANEURYSM WITH COILING presents to er alert and oriented x 4 with complaints of dizziness since 0200 this AM, states awakened from sleeping due to symptoms, "felt like someone was ricking the bed", went back to sleep, woke up again at 0500 AM to use the bathroom, upon walking to use bathroom felt like room was spinning, went back to bed, states intermittent dizziness and head pressure since first episode, denies blurry vision, headache, nausea, cough and all other complaints, labs collected, ct head completed, neuro intact, see yellow flowsheet. ISMA Osorio

## 2021-04-29 NOTE — ED ADULT NURSE REASSESSMENT NOTE - NS ED NURSE REASSESS COMMENT FT1
Pt reports some relief from Reglan (see MAR). Denies headache, blurred vision. Reports photosensitivity, some dizziness. Denies chest pain, nausea, abdominal pain. Pt updated on plan of awaiting COVID swab results then moving to CDU.

## 2021-04-29 NOTE — ED CDU PROVIDER DISPOSITION NOTE - NSFOLLOWUPINSTRUCTIONS_ED_ALL_ED_FT
Follow up with your primary care doctor within 1 week    Follow up with Neurology in 1-5 days - see attached contact info.  *Bring all attached lab/test results.*    Stay well hydrated.  Continue current home medications    Read stroke education material given to you.    Return if symptoms worsen, fever, weakness, numbness, dizziness, vision change, slurred speech and all other concerns Follow-up with your primary care provider in 1-2 days.    Follow-up with neurology within 1 week. Call for an appointment.    Tejas Hamilton)  Neurology; Vascular Neurology  [address], [address]  [place] [address]  Phone: [phone]  Fax: [phone]    Follow-up with neurosurgery in 2-4 weeks. Call for an appointment.    Dr. Deleon  Phone: [phone]  Fax: [phone]  [address] [place] [address], [address] [place] [address]    Take Meclizine 25mg every 6 hours as needed for vertigo symptoms. Continue taking all other medications as prescribed.    Return to the emergency room immediately if your symptoms worsen or persist, or if you have fever, headache, change in vision, numbness, tingling, weakness, difficulty speaking, walking, swallowing, or if any other concerning or questionable symptoms.

## 2021-04-29 NOTE — ED PROVIDER NOTE - OBSERVING MD:
CARDIOVASCULAR - ADULT    • Maintains optimal cardiac output and hemodynamic stability Progressing    • Absence of cardiac arrhythmias or at baseline Progressing        Impaired Functional Mobility    • Achieve highest/safest level of mobility/gait [place]

## 2021-04-29 NOTE — ED ADULT NURSE REASSESSMENT NOTE - NS ED NURSE REASSESS COMMENT FT1
Patient resting in bed comfortably in no acute distress, pending further orders, patient updated on plan of care. Will continue to monitor. ISMA Osorio Patient resting in bed comfortably in no acute distress, alert and oriented x 4, denies all complaints currently, pending further orders, patient updated on plan of care. Will continue to monitor. ISMA Osorio

## 2021-04-29 NOTE — ED CDU PROVIDER INITIAL DAY NOTE - DETAILS
66y f p/w dizziness:  -q4 neuro checks, MRI head w/o, tele, meclizine PRN for dizziness, neurology/neurosurgery eval    d/w Dr Garcia

## 2021-04-29 NOTE — ED CDU PROVIDER SUBSEQUENT DAY NOTE - NS ED ROS FT
REVIEW OF SYSTEMS:  General: +dizziness, no fever, no chills  HEENT: no headache, no vision changes  Cardiac: no chest pain, no palpitations  Respiratory: no cough, no shortness of breath  Gastrointestinal: no abdominal pain, no nausea, no vomiting, no diarrhea  Genitourinary: no hematuria, no dysuria, no urinary frequency  Extremities: no extremity swelling, no extremity pain  Neuro: no focal weakness, no numbness/tingling of the extremities, no decreased sensation  Heme: no easy bleeding, no easy bruising  Skin: no jaundice,  no rashes, no lesions  All other ROS as documented in HPI

## 2021-04-29 NOTE — ED PROVIDER NOTE - CLINICAL SUMMARY MEDICAL DECISION MAKING FREE TEXT BOX
66F presents with dizziness, last known normal last night. Episodic vertigo likely peripheral, code stroke called from triage. Will give meds, Follow up labs, CTA, discuss with neuro. 66F presents with dizziness, last known normal last night. Episodic vertigo likely peripheral, code stroke called from triage. Will give meds, Follow up labs, CTA, discuss with neuro.  Jose: dizziness since waking, intermittent has happened when laying and standing. No new focal change. no dizziness. now. No headache. Called as code stroke, neuro resident present as pt rushed to CT scan. Hx of TIAs. hx of aneurism with clip.

## 2021-04-29 NOTE — ED CDU PROVIDER SUBSEQUENT DAY NOTE - PROGRESS NOTE DETAILS
CDU PROGRESS NOTE LATASHA BOYER: pt resting comfortably without complaint. NAD. VSS. neuro exam nonfocal. No events on tele. pending MRI in am. Will continue to monitor. Patient seen at bedside in NAD. VSS. Patient resting comfortably. C/o mild intermittent dizziness. Neuro intact, clear speech. No events on tele. MRI pending. Patient seen at bedside in NAD. VSS. Patient resting comfortably without complaints sitting up eating. MRI results pending. Dispo pending final neuro / neurosx recs MRI unremarkable for acute pathology, cleared for DC by neuro (s/w Dr. Hamilton) and neurosx with outpt follow-up. Neuro exams remain unremarkable, normal speech, ambulating in CDU, pt feeling well and eager to go home. Will DC with Meclizine, pharmacy confirmed. No event on tele. Pt endorsed and cleared for DC by ED attending Dr. Sierra.

## 2021-04-29 NOTE — ED ADULT NURSE REASSESSMENT NOTE - NS ED NURSE REASSESS COMMENT FT1
Received report from ISMA Tripp. Pt resting comfortably in dark room with sunglasses on, pt complaining of photosensitivity. Pt is A&Ox3, speaking coherently. IV in left AC infiltrated, writer removed IV at this time. IV in right upper arm patent. Pt updated on plan of awaiting symptom alleviation.

## 2021-04-29 NOTE — ED CDU PROVIDER DISPOSITION NOTE - CLINICAL COURSE
66F PMH spinal stenosis, cervical fusion, asthma, ruptured brain aneurysm, HTN, TIA presents with dizziness. Pt states this morning was woken up by dizziness when she was laying in bed. States intermittent, lasting 30 seconds to a minute. Has occurred today while sitting and standing but not explicity provoked by head movement. No recent illnesses, last known normal last night.  In ED all labs and imaging were nonactionable. pt was seen by neurology who recommended MRI w/o, asa/brilinta, permissive HTN, neurosurgery eval  In CDU, 66F PMH spinal stenosis, cervical fusion, asthma, ruptured brain aneurysm, HTN, TIA presents with dizziness. Pt states this morning was woken up by dizziness when she was laying in bed. States intermittent, lasting 30 seconds to a minute. Has occurred today while sitting and standing but not explicity provoked by head movement. No recent illnesses, last known normal last night.  In ED all labs and imaging were nonactionable. pt was seen by neurology who recommended MRI w/o, asa/brilinta, permissive HTN, neurosurgery eval  In CDU, MRI unremarkable for acute pathology, cleared for DC by neuro (s/w Dr. Hamilton) and neurosx with outpt follow-up. Neuro exams remain unremarkable, normal speech, ambulating in CDU, pt feeling well and eager to go home. Will DC with Meclizine, pharmacy confirmed. No event on tele. Pt endorsed and cleared for DC by ED attending Dr. Sierra.

## 2021-04-29 NOTE — ED CDU PROVIDER DISPOSITION NOTE - CARE PROVIDER_API CALL
Tejas Hamilton)  Neurology; Vascular Neurology  [address] [facility] [facility] [address]  [place] [address]  Phone: [phone]  Fax: [phone]  Follow Up Time:

## 2021-04-29 NOTE — ED ADULT NURSE REASSESSMENT NOTE - NS ED NURSE REASSESS COMMENT FT1
Received pt from ISMA Fuentes , received pt alert and responsive, oriented x4, denies any respiratory distress, SOB, or difficulty breathing. Pt transferred to CDU for dizziness, photophobia and headache. Pending MRI in AM pt aware. On telemetry pt is SR hr: 60's. IV in place, patent and free of signs of infiltration,  pt denies chest pain or palpitations, V/S stable, pt afebrile,  Pt educated on unit and unit rules, instructed patient to notify RN of any needed assistance, Pt verbalizes understanding, Call bell placed within reach. Safety maintained. Will continue to monitor.

## 2021-04-29 NOTE — ED PROVIDER NOTE - OBJECTIVE STATEMENT
66F PMH spinal stenosis, cervical fusion, asthma, ruptured brain aneurysm, HTN, TIA presents with dizziness. Pt states this morning was woken up by dizziness when she was laying in bed. States intermittent, lasting 30 seconds to a minute. Has occurred today while sitting and standing but not explicity provoked by head movement. No recent illnesses, last known normal last night.

## 2021-04-29 NOTE — ED PROVIDER NOTE - PHYSICAL EXAMINATION
General: Well developed, well nourished  HEENT: Normocephalic and atraumatic, EOMI, Trachea midline.   Cardiac: Normal S1 and S2 w/ RRR. No MRG.  Pulmonary: CTA bilaterally. No increased WOB.   Abdominal: Soft, NTND  Neurologic: CN II-XII intact. Muscle strength 5/5 in all extremities. Negative pronator drift.   Musculoskeletal: No limited ROM.  Vascular: Warm and well perfused  Skin: Color appropriate for race.   Psychiatric: Appropriate mood and affect. No apparent risk to self or others.  Adair Gómez M.D. PGY-3

## 2021-04-30 VITALS
DIASTOLIC BLOOD PRESSURE: 77 MMHG | OXYGEN SATURATION: 97 % | SYSTOLIC BLOOD PRESSURE: 112 MMHG | HEART RATE: 73 BPM | RESPIRATION RATE: 16 BRPM | TEMPERATURE: 98 F

## 2021-04-30 LAB
A1C WITH ESTIMATED AVERAGE GLUCOSE RESULT: 7.3 % — HIGH (ref 4–5.6)
CHOLEST SERPL-MCNC: 125 MG/DL — SIGNIFICANT CHANGE UP
ESTIMATED AVERAGE GLUCOSE: 163 MG/DL — HIGH (ref 68–114)
HDLC SERPL-MCNC: 68 MG/DL — SIGNIFICANT CHANGE UP
LIPID PNL WITH DIRECT LDL SERPL: 44 MG/DL — SIGNIFICANT CHANGE UP
NON HDL CHOLESTEROL: 57 MG/DL — SIGNIFICANT CHANGE UP
TRIGL SERPL-MCNC: 66 MG/DL — SIGNIFICANT CHANGE UP

## 2021-04-30 PROCEDURE — U0005: CPT

## 2021-04-30 PROCEDURE — 99217: CPT | Mod: CS

## 2021-04-30 PROCEDURE — G0378: CPT

## 2021-04-30 PROCEDURE — 80061 LIPID PANEL: CPT

## 2021-04-30 PROCEDURE — 80053 COMPREHEN METABOLIC PANEL: CPT

## 2021-04-30 PROCEDURE — 70498 CT ANGIOGRAPHY NECK: CPT

## 2021-04-30 PROCEDURE — 96374 THER/PROPH/DIAG INJ IV PUSH: CPT | Mod: XU

## 2021-04-30 PROCEDURE — 83036 HEMOGLOBIN GLYCOSYLATED A1C: CPT

## 2021-04-30 PROCEDURE — U0003: CPT

## 2021-04-30 PROCEDURE — 85025 COMPLETE CBC W/AUTO DIFF WBC: CPT

## 2021-04-30 PROCEDURE — 85730 THROMBOPLASTIN TIME PARTIAL: CPT

## 2021-04-30 PROCEDURE — 70551 MRI BRAIN STEM W/O DYE: CPT | Mod: 26,MA

## 2021-04-30 PROCEDURE — 93005 ELECTROCARDIOGRAM TRACING: CPT

## 2021-04-30 PROCEDURE — 70450 CT HEAD/BRAIN W/O DYE: CPT

## 2021-04-30 PROCEDURE — 70551 MRI BRAIN STEM W/O DYE: CPT

## 2021-04-30 PROCEDURE — 82962 GLUCOSE BLOOD TEST: CPT

## 2021-04-30 PROCEDURE — 99291 CRITICAL CARE FIRST HOUR: CPT | Mod: 25

## 2021-04-30 PROCEDURE — 84484 ASSAY OF TROPONIN QUANT: CPT

## 2021-04-30 PROCEDURE — 70496 CT ANGIOGRAPHY HEAD: CPT

## 2021-04-30 PROCEDURE — 85610 PROTHROMBIN TIME: CPT

## 2021-04-30 RX ORDER — MECLIZINE HCL 12.5 MG
1 TABLET ORAL
Qty: 40 | Refills: 0
Start: 2021-04-30 | End: 2021-05-09

## 2021-04-30 RX ORDER — MECLIZINE HCL 12.5 MG
25 TABLET ORAL ONCE
Refills: 0 | Status: COMPLETED | OUTPATIENT
Start: 2021-04-30 | End: 2021-04-30

## 2021-04-30 RX ADMIN — Medication 25 MILLIGRAM(S): at 08:14

## 2021-04-30 NOTE — CONSULT NOTE ADULT - ASSESSMENT
Sunshine Castrejon  66F PMHx L PICA aneurysm coiled (Tx in [place] 2008), c/b residual s/p pipeline 6/2020. Patient seen in office 3/8 with plan for f/u outpatient DSA 6/2021. Presented to ED w/ intermittent vertigo for which neruology evaluated and suspects a peripheral cause. CTA today shows patent L vert stent. Exam: neurointact at this time, unable to elicit vertigo with head/positional changes, no nystagmus w/ positional change. Denies other complaints.   - Continue ASA/brilinta  - CDU for MRI brain  - Plan pending imaging  
Sunshine Castrejon is a 66 year old RH female with a past medical history of spinal stenosis, cervical fusion, asthma, HTN, TIA, ruptured PICA aneurysm leading to subarachnoid hemorrhage s/p coiling and flow diverting stent with Dr. Deleon. Neurology consulted via code stroke for vertigo.    Impression: intense onset and brief episodes of vertigo with no other lateralizing deficits more suggestive of a peripheral cause of vertigo such as BPPV. Has history of stent placement and encephalomalacia in the L cerebellum. Symptoms may also be in the setting of hypoperfusion as the patient has evidence of encephalomalacia on CT head.    Recs:  [] continue aspirin 81mg and home Brillinta  [] continue home statin  [] neurosurgery evaluation as patient has a history of flow diverter stent if repeat imaging or angiography needs to be done  [] MRI brain w/o contrast can be done as outpatient if patient is discharged  [] obtain orthostatics  [] meclizine 25mg tid prn if patient is symptomatic  [] can keep permissive -180  [] will need neurology outpatient follow up, can follow up with Dr. Hamilton, [address] ([phone])  [] will need follow up with neurosurgery, Dr. Deleon  [] will need ENT followup    Case discussed with stroke fellow, Dr. Mistry. Further recommendations to follow upon review by stroke attending, Dr. Hamilton.

## 2021-04-30 NOTE — CHART NOTE - NSCHARTNOTEFT_GEN_A_CORE
No new infarcts seen on MRI,  FLAIR L cerebellar signal a/w gliosis compared to previous mri.     No neurosurgical contraindication to discharge, should fu outpatient w/ Dr. Deleon in 2-4 weeks.

## 2021-04-30 NOTE — CONSULT NOTE ADULT - SUBJECTIVE AND OBJECTIVE BOX
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 Deleon. At baseline, the patient is ambulatory without assistive devices and is alert and oriented to all modalities. The patient was in her usual state of health and then was awoken with symptoms of vertigo. She noted that the symptoms lasted for several minutes and then subsequently dissipiated. She had several more episodes when she stood up or changed positions which lasted from seconds to minute. Had associated symptoms of nausea but no vomiting. Noted that she felt like her gait was slightly unstable and had to hold on to walls but did not fall. She denies any headaches, numbness, tingling, syncope, weakness, visual changes, diplopia, auditory changes. She had her 2nd COVID vaccine dose, Moderna, on 3/29. Currently, she feels that she is back to her baseline. Notes that she has had symptoms like this in the past and was told that this was secondary to TIAs.  Of note, she had an MRA done in February 2021 which did show that there was abnormal flow in the L PICA and was contacted to be scheduled for a conventional angiogram in June.     (Stroke only)  NIHSS: 0  MRS: 0    REVIEW OF SYSTEMS    A 10-system ROS was performed and is negative except for those items noted above and/or in the HPI.    PAST MEDICAL & SURGICAL HISTORY:  Asthma  never been intubated, or hospitalized.,    HTN - Hypertension    Hyperlipidemia    MVP (Mitral Valve Prolapse)    Renal Calculi    GERD (Gastroesophageal Reflux Disease)    Carpal Tunnel Syndrome    Cervical herniated disc    Migraine    Obesity    TIA (transient ischemic attack)  x3 last one in 2/16    DVT (deep venous thrombosis)  right lower leg 2015, s/p right knee surgery to correct congenital abnormality    Diabetes mellitus  II    Cervical Fusion x 2  1999, 2000    Brain Aneurysm (coiling)  2008    S/P Total Abdominal Hysterectomy  1991    S/P Arthroscopic Knee Surgery  2006    H/O cataract extraction  2013    S/P knee surgery  2015 right      FAMILY HISTORY:  No pertinent family history in first degree relatives    MEDICATIONS (HOME):  Home Medications:  Advair Diskus:  inhaled  (24 Feb 2020 14:53)  Allegra:  orally  (24 Feb 2020 14:53)  azelastine nasal:  (24 Feb 2020 14:53)  metFORMIN 500 mg oral tablet:  (24 Feb 2020 14:53)  NexIUM 40 mg oral delayed release capsule: 1 cap(s) orally once a day (24 Feb 2020 14:53)  Norvasc 5 mg oral tablet: 1 tab(s) orally once a day (24 Feb 2020 14:53)  rOPINIRole 0.5 mg oral tablet:  (24 Feb 2020 14:53)  Singulair:  orally  (24 Feb 2020 14:53)  Zocor 40 mg oral tablet: 1 tab(s) orally once a day (at bedtime) (24 Feb 2020 14:53)    ALLERGIES/INTOLERANCES:  Allergies  adhesives (Unknown)  NSAID (Anaphylaxis)  onion, garlic (Anaphylaxis)  PCN (Anaphylaxis)  shellfish (Anaphylaxis)  vancomycine (Anaphylaxis)    Intolerances    VITALS & EXAMINATION:  Vital Signs Last 24 Hrs  T(C): 36.7 (29 Apr 2021 18:15), Max: 36.7 (29 Apr 2021 18:15)  T(F): 98.1 (29 Apr 2021 18:15), Max: 98.1 (29 Apr 2021 18:15)  HR: 65 (29 Apr 2021 18:15) (65 - 78)  BP: 128/73 (29 Apr 2021 18:15) (128/73 - 185/81)  BP(mean): 109 (29 Apr 2021 17:29) (109 - 109)  RR: 16 (29 Apr 2021 18:15) (16 - 20)  SpO2: 97% (29 Apr 2021 18:15) (97% - 99%)    General:  Constitutional: Appears stated age, in no apparent distress including pain.  Head: Normocephalic & atraumatic.    Neurological (>12):  MS: Awake, alert, oriented to person, place, situation, time. Normal affect. Follows all commands.    Language: Speech is clear, fluent with good repetition & comprehension.    CNs: PERRLA (R = 3mm, L = 3mm). VF intact in all 4 quadrants. EOMI no nystagmus, no diplopia. V1-3 intact to LT, well developed masseter muscles b/l. No facial asymmetry b/l. Symmetric palate elevation in midline. Shoulder shrug intact b/l. Tongue midline, normal movements, no atrophy.    Motor: Normal muscle bulk & tone. No noticeable tremor or seizure. No pronator drift.                Deltoid	Biceps	Triceps    R	5	5	5	5		  L	5	5	5	5	    	H-Flex	H-Ext	K-Flex	K-Ext	D-Flex	P-Flex  R	5	5	5	5	5	5	   L	5	5	5	5	5	5     Sensation: Intact to LT b/l throughout.     Coordination:  No dysmetria to FTN/HTS.    Gait: Normal Romberg. No postural instability. Cautious gait but no overt ataxia.     LABORATORY:  CBC                       15.0   9.50  )-----------( 209      ( 29 Apr 2021 17:10 )             46.6     Chem 04-29    139  |  104  |  11  ----------------------------<  116<H>  4.0   |  22  |  0.60    Ca    10.0      29 Apr 2021 17:10    TPro  7.3  /  Alb  4.2  /  TBili  0.4  /  DBili  x   /  AST  21  /  ALT  18  /  AlkPhos  96  04-29    LFTs LIVER FUNCTIONS - ( 29 Apr 2021 17:10 )  Alb: 4.2 g/dL / Pro: 7.3 g/dL / ALK PHOS: 96 U/L / ALT: 18 U/L / AST: 21 U/L / GGT: x           Coagulopathy PT/INR - ( 29 Apr 2021 17:10 )   PT: 12.0 sec;   INR: 1.00 ratio         PTT - ( 29 Apr 2021 17:10 )  PTT:23.4 sec    STUDIES & IMAGING:    Radiology (XR, CT, MR, U/S, TTE/OLIVE):    < from: CT Brain Stroke Protocol (04.29.21 @ 17:12) >  IMPRESSION:  No acute intracranial hemorrhage, mass effect, or midline shift.  Streak artifact from stent and coil in the distal left vertebral artery limits evaluation of the cerebellum.  Redemonstration of encephalomalacia in the left posterior inferior cerebellum.    < end of copied text >    < from: CT Angio Head w/ IV Cont (04.29.21 @ 17:23) >  FINDINGS:    CTA NECK AND BRAIN:  GREAT VESSELS: Atherosclerotic changes of the aorta.  COMMON CAROTID ARTERIES: Within normal limits.  CAROTID BIFURCATIONS: Atherosclerotic changes without hemodynamically significant stenosis.  INTERNAL CAROTID ARTERIES: Atherosclerotic changes without hemodynamically significant stenosis.    VERTEBRAL ARTERIES: Left intradural vertebral artery stent and coil are present. The stent appears patent.    ANTERIOR CIRCULATION: Patent without stenosis or aneurysm.  POSTERIOR CIRCULATION: Patent without stenosis or aneurysm.    VISUALIZED LUNGS: Within normal limits.  BONES: Status post C3-C5 laminectomy and posterior fusion from C3 through C5.. Degenerative changes.    IMPRESSION:    CTA neck and brain: No vessel occlusion or significant stenosis. Chronic changes as above.    < end of copied text >  
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 Deleon. At baseline, the patient is ambulatory without assistive devices and is alert and oriented to all modalities. The patient was in her usual state of health and then was awoken with symptoms of vertigo. She noted that the symptoms lasted for several minutes and then subsequently dissipiated. She had several more episodes when she stood up or changed positions which lasted from seconds to minute. Had associated symptoms of nausea but no vomiting. Noted that she felt like her gait was slightly unstable and had to hold on to walls but did not fall. She denies any headaches, numbness, tingling, syncope, weakness, visual changes, diplopia, auditory changes. She had her 2nd COVID vaccine dose, Moderna, on 3/29. Currently, she feels that she is back to her baseline. Notes that she has had symptoms like this in the past and was told that this was secondary to TIAs.  Of note, she had an MRA done in February 2021 which did show that there was abnormal flow in the L PICA and was contacted to be scheduled for a conventional angiogram in June.       Imaging:    Exam: neurointact, unable to elicit vertigo with head/positional changes, no nystagmus w/ positional change. Denies other complaints.     --Anticoagulation:    =====================  PAST MEDICAL HISTORY   Asthma    HTN - Hypertension    Hyperlipidemia    MVP (Mitral Valve Prolapse)    Renal Calculi    GERD (Gastroesophageal Reflux Disease)    Carpal Tunnel Syndrome    Cervical herniated disc    Migraine    Obesity    TIA (transient ischemic attack)    DVT (deep venous thrombosis)    Diabetes mellitus      PAST SURGICAL HISTORY   Cervical Fusion x 2    Brain Aneurysm (coiling)    S/P Total Abdominal Hysterectomy    S/P Arthroscopic Knee Surgery    H/O cataract extraction    S/P knee surgery      adhesives (Unknown)  NSAID (Anaphylaxis)  onion, garlic (Anaphylaxis)  PCN (Anaphylaxis)  shellfish (Anaphylaxis)  vancomycine (Anaphylaxis)      MEDICATIONS:  Antibiotics:    Neuro:    Other:  simvastatin 20 milliGRAM(s) Oral at bedtime      SOCIAL HISTORY:   Occupation:   Marital Status:     FAMILY HISTORY:  No pertinent family history in first degree relatives        ROS: Negative except per HPI    LABS:  PT/INR - ( 29 Apr 2021 17:10 )   PT: 12.0 sec;   INR: 1.00 ratio         PTT - ( 29 Apr 2021 17:10 )  PTT:23.4 sec                        15.0   9.50  )-----------( 209      ( 29 Apr 2021 17:10 )             46.6     04-29    139  |  104  |  11  ----------------------------<  116<H>  4.0   |  22  |  0.60    Ca    10.0      29 Apr 2021 17:10    TPro  7.3  /  Alb  4.2  /  TBili  0.4  /  DBili  x   /  AST  21  /  ALT  18  /  AlkPhos  96  04-29

## 2021-06-16 ENCOUNTER — NON-APPOINTMENT (OUTPATIENT)
Age: 66
End: 2021-06-16

## 2021-09-10 ENCOUNTER — OUTPATIENT (OUTPATIENT)
Dept: OUTPATIENT SERVICES | Facility: HOSPITAL | Age: 66
LOS: 1 days | End: 2021-09-10
Payer: MEDICARE

## 2021-09-10 VITALS
HEIGHT: 64 IN | WEIGHT: 171.96 LBS | RESPIRATION RATE: 16 BRPM | OXYGEN SATURATION: 95 % | TEMPERATURE: 98 F | HEART RATE: 79 BPM | SYSTOLIC BLOOD PRESSURE: 145 MMHG | DIASTOLIC BLOOD PRESSURE: 73 MMHG

## 2021-09-10 DIAGNOSIS — I67.1 CEREBRAL ANEURYSM, NONRUPTURED: ICD-10-CM

## 2021-09-10 DIAGNOSIS — Z98.89 OTHER SPECIFIED POSTPROCEDURAL STATES: Chronic | ICD-10-CM

## 2021-09-10 DIAGNOSIS — Z01.818 ENCOUNTER FOR OTHER PREPROCEDURAL EXAMINATION: ICD-10-CM

## 2021-09-10 DIAGNOSIS — Z98.890 OTHER SPECIFIED POSTPROCEDURAL STATES: Chronic | ICD-10-CM

## 2021-09-10 DIAGNOSIS — Z98.49 CATARACT EXTRACTION STATUS, UNSPECIFIED EYE: Chronic | ICD-10-CM

## 2021-09-10 DIAGNOSIS — Z86.79 PERSONAL HISTORY OF OTHER DISEASES OF THE CIRCULATORY SYSTEM: Chronic | ICD-10-CM

## 2021-09-10 LAB
ANION GAP SERPL CALC-SCNC: 14 MMOL/L — SIGNIFICANT CHANGE UP (ref 5–17)
APTT BLD: 31.1 SEC — SIGNIFICANT CHANGE UP (ref 27.5–35.5)
BLD GP AB SCN SERPL QL: NEGATIVE — SIGNIFICANT CHANGE UP
BUN SERPL-MCNC: 11 MG/DL — SIGNIFICANT CHANGE UP (ref 7–23)
CALCIUM SERPL-MCNC: 9.8 MG/DL — SIGNIFICANT CHANGE UP (ref 8.4–10.5)
CHLORIDE SERPL-SCNC: 102 MMOL/L — SIGNIFICANT CHANGE UP (ref 96–108)
CO2 SERPL-SCNC: 22 MMOL/L — SIGNIFICANT CHANGE UP (ref 22–31)
CREAT SERPL-MCNC: 0.65 MG/DL — SIGNIFICANT CHANGE UP (ref 0.5–1.3)
GLUCOSE SERPL-MCNC: 159 MG/DL — HIGH (ref 70–99)
HCT VFR BLD CALC: 45.7 % — HIGH (ref 34.5–45)
HGB BLD-MCNC: 14.8 G/DL — SIGNIFICANT CHANGE UP (ref 11.5–15.5)
INR BLD: 1.07 RATIO — SIGNIFICANT CHANGE UP (ref 0.88–1.16)
MCHC RBC-ENTMCNC: 29.8 PG — SIGNIFICANT CHANGE UP (ref 27–34)
MCHC RBC-ENTMCNC: 32.4 GM/DL — SIGNIFICANT CHANGE UP (ref 32–36)
MCV RBC AUTO: 92.1 FL — SIGNIFICANT CHANGE UP (ref 80–100)
NRBC # BLD: 0 /100 WBCS — SIGNIFICANT CHANGE UP (ref 0–0)
PLATELET # BLD AUTO: 207 K/UL — SIGNIFICANT CHANGE UP (ref 150–400)
POTASSIUM SERPL-MCNC: 3.5 MMOL/L — SIGNIFICANT CHANGE UP (ref 3.5–5.3)
POTASSIUM SERPL-SCNC: 3.5 MMOL/L — SIGNIFICANT CHANGE UP (ref 3.5–5.3)
PROTHROM AB SERPL-ACNC: 12.8 SEC — SIGNIFICANT CHANGE UP (ref 10.6–13.6)
RBC # BLD: 4.96 M/UL — SIGNIFICANT CHANGE UP (ref 3.8–5.2)
RBC # FLD: 13.3 % — SIGNIFICANT CHANGE UP (ref 10.3–14.5)
RH IG SCN BLD-IMP: POSITIVE — SIGNIFICANT CHANGE UP
SODIUM SERPL-SCNC: 138 MMOL/L — SIGNIFICANT CHANGE UP (ref 135–145)
WBC # BLD: 8.09 K/UL — SIGNIFICANT CHANGE UP (ref 3.8–10.5)
WBC # FLD AUTO: 8.09 K/UL — SIGNIFICANT CHANGE UP (ref 3.8–10.5)

## 2021-09-10 PROCEDURE — 80048 BASIC METABOLIC PNL TOTAL CA: CPT

## 2021-09-10 PROCEDURE — 85730 THROMBOPLASTIN TIME PARTIAL: CPT

## 2021-09-10 PROCEDURE — 86900 BLOOD TYPING SEROLOGIC ABO: CPT

## 2021-09-10 PROCEDURE — 83036 HEMOGLOBIN GLYCOSYLATED A1C: CPT

## 2021-09-10 PROCEDURE — 85027 COMPLETE CBC AUTOMATED: CPT

## 2021-09-10 PROCEDURE — 86850 RBC ANTIBODY SCREEN: CPT

## 2021-09-10 PROCEDURE — G0463: CPT

## 2021-09-10 PROCEDURE — 86901 BLOOD TYPING SEROLOGIC RH(D): CPT

## 2021-09-10 PROCEDURE — 85610 PROTHROMBIN TIME: CPT

## 2021-09-10 RX ORDER — METFORMIN HYDROCHLORIDE 850 MG/1
0 TABLET ORAL
Qty: 0 | Refills: 0 | DISCHARGE

## 2021-09-10 NOTE — H&P PST ADULT - PSYCHIATRIC DETAILS
Asthma    CHF (congestive heart failure)  with EF of 10% s/p AICD  DM (diabetes mellitus)    HTN (hypertension)    Thyroid ca
normal behavior

## 2021-09-10 NOTE — H&P PST ADULT - NEGATIVE NEUROLOGICAL SYMPTOMS
no weakness/no paresthesias/no generalized seizures/no focal seizures/no syncope/no tremors/no vertigo/no loss of sensation/no difficulty walking/no headache

## 2021-09-10 NOTE — H&P PST ADULT - ACTIVITY
able to climb a flight of stairs, able to walk 2-3 blocks, able to do moderate household chores, mops floors, does yardwork

## 2021-09-10 NOTE — H&P PST ADULT - OTHER CARE PROVIDERS
PULMONOLOGIST--DR. ALEXIA CASIANO, [place], . [phone]//ENDOCRINE--DR. MARIA R LYNN, [place], [phone]

## 2021-09-10 NOTE — H&P PST ADULT - NSICDXPASTSURGICALHX_GEN_ALL_CORE_FT
PAST SURGICAL HISTORY:  Brain Aneurysm (coiling) 2008    Cervical Fusion x 2 1999, 2000    H/O cataract extraction 2013    History of cerebral aneurysm s/p cerebral stent june 2020    S/P Arthroscopic Knee Surgery 2006    S/P knee surgery 2015 right    S/P rotator cuff repair right--2017    S/P Total Abdominal Hysterectomy 1991    Status post osteotomy left knee 2019

## 2021-09-10 NOTE — H&P PST ADULT - HISTORY OF PRESENT ILLNESS
66 YEAR OLD RIGHT HANDED  FEMALE WITH PMH OF COPD, ASTHMA, HTN, HLD, RLS, TIA, RUPTURED LEFT PICA CEREBRAL ANEURYSM, SAH, S/P COILING (DIOR), WITH RECURRENT ANEURYSM, S/P FLOW DIVERTING STENT (JUNE 2020), WHO PRESENTS TO [facility] FOR EVALUATION PRIOR TO SCHEDULED FOLLOW-UP CEREBRAL ANGIOGRAM ON 9/15/2021. SHE CURRENTLY DENIES HEADACHES, VISUAL CHANGES.    PREOP COVID SCREEN 9/12 @ [facility]

## 2021-09-10 NOTE — H&P PST ADULT - NSICDXPASTMEDICALHX_GEN_ALL_CORE_FT
PAST MEDICAL HISTORY:  Asthma never been intubated, or hospitalized.,    Carpal Tunnel Syndrome     Cervical herniated disc     COPD, moderate last flare 3 years ago, never hospitalized    Diabetes mellitus II    DVT (deep venous thrombosis) right lower leg 2015, s/p right knee surgery to correct congenital abnormality    GERD (Gastroesophageal Reflux Disease)     HTN - Hypertension     Hyperlipidemia     Migraine     MVP (Mitral Valve Prolapse)     Obesity     Renal Calculi     TIA (transient ischemic attack) x3 last one in 2/16

## 2021-09-11 LAB
A1C WITH ESTIMATED AVERAGE GLUCOSE RESULT: 6.9 % — HIGH (ref 4–5.6)
ESTIMATED AVERAGE GLUCOSE: 151 MG/DL — HIGH (ref 68–114)

## 2021-09-12 ENCOUNTER — OUTPATIENT (OUTPATIENT)
Dept: OUTPATIENT SERVICES | Facility: HOSPITAL | Age: 66
LOS: 1 days | End: 2021-09-12
Payer: MEDICARE

## 2021-09-12 DIAGNOSIS — Z86.79 PERSONAL HISTORY OF OTHER DISEASES OF THE CIRCULATORY SYSTEM: Chronic | ICD-10-CM

## 2021-09-12 DIAGNOSIS — Z98.89 OTHER SPECIFIED POSTPROCEDURAL STATES: Chronic | ICD-10-CM

## 2021-09-12 DIAGNOSIS — Z98.890 OTHER SPECIFIED POSTPROCEDURAL STATES: Chronic | ICD-10-CM

## 2021-09-12 DIAGNOSIS — Z98.49 CATARACT EXTRACTION STATUS, UNSPECIFIED EYE: Chronic | ICD-10-CM

## 2021-09-12 DIAGNOSIS — Z11.52 ENCOUNTER FOR SCREENING FOR COVID-19: ICD-10-CM

## 2021-09-12 LAB — SARS-COV-2 RNA SPEC QL NAA+PROBE: SIGNIFICANT CHANGE UP

## 2021-09-12 PROCEDURE — U0005: CPT

## 2021-09-12 PROCEDURE — C9803: CPT

## 2021-09-12 PROCEDURE — U0003: CPT

## 2021-09-15 ENCOUNTER — APPOINTMENT (OUTPATIENT)
Dept: NEUROSURGERY | Facility: HOSPITAL | Age: 66
End: 2021-09-15
Payer: MEDICARE

## 2021-09-15 ENCOUNTER — NON-APPOINTMENT (OUTPATIENT)
Age: 66
End: 2021-09-15

## 2021-09-15 ENCOUNTER — OUTPATIENT (OUTPATIENT)
Dept: OUTPATIENT SERVICES | Facility: HOSPITAL | Age: 66
LOS: 1 days | End: 2021-09-15
Payer: MEDICARE

## 2021-09-15 ENCOUNTER — RESULT REVIEW (OUTPATIENT)
Age: 66
End: 2021-09-15

## 2021-09-15 VITALS
RESPIRATION RATE: 18 BRPM | WEIGHT: 171.08 LBS | HEIGHT: 64 IN | HEART RATE: 69 BPM | TEMPERATURE: 98 F | DIASTOLIC BLOOD PRESSURE: 71 MMHG | OXYGEN SATURATION: 96 % | SYSTOLIC BLOOD PRESSURE: 164 MMHG

## 2021-09-15 VITALS
OXYGEN SATURATION: 96 % | TEMPERATURE: 97 F | HEART RATE: 61 BPM | DIASTOLIC BLOOD PRESSURE: 57 MMHG | RESPIRATION RATE: 14 BRPM | SYSTOLIC BLOOD PRESSURE: 119 MMHG

## 2021-09-15 DIAGNOSIS — Z98.89 OTHER SPECIFIED POSTPROCEDURAL STATES: Chronic | ICD-10-CM

## 2021-09-15 DIAGNOSIS — I67.1 CEREBRAL ANEURYSM, NONRUPTURED: ICD-10-CM

## 2021-09-15 DIAGNOSIS — Z98.890 OTHER SPECIFIED POSTPROCEDURAL STATES: Chronic | ICD-10-CM

## 2021-09-15 DIAGNOSIS — Z86.79 PERSONAL HISTORY OF OTHER DISEASES OF THE CIRCULATORY SYSTEM: Chronic | ICD-10-CM

## 2021-09-15 DIAGNOSIS — Z98.49 CATARACT EXTRACTION STATUS, UNSPECIFIED EYE: Chronic | ICD-10-CM

## 2021-09-15 PROCEDURE — C1894: CPT

## 2021-09-15 PROCEDURE — C1887: CPT

## 2021-09-15 PROCEDURE — 36226 PLACE CATH VERTEBRAL ART: CPT

## 2021-09-15 PROCEDURE — C1769: CPT

## 2021-09-15 PROCEDURE — 36226 PLACE CATH VERTEBRAL ART: CPT | Mod: LT

## 2021-09-15 RX ORDER — TICAGRELOR 90 MG/1
1 TABLET ORAL
Qty: 0 | Refills: 0 | DISCHARGE

## 2021-09-15 RX ORDER — SODIUM CHLORIDE 9 MG/ML
1000 INJECTION INTRAMUSCULAR; INTRAVENOUS; SUBCUTANEOUS
Refills: 0 | Status: DISCONTINUED | OUTPATIENT
Start: 2021-09-15 | End: 2021-09-29

## 2021-09-15 NOTE — CHART NOTE - NSCHARTNOTEFT_GEN_A_CORE
Interventional Neuro Radiology  Pre-Procedure Note PA-C    This is a 66y ____ hand dominant Female      HPI:      Allergies: adhesives (Unknown)  NSAID (Anaphylaxis)  onion, garlic (Anaphylaxis)  PCN (Anaphylaxis)  shellfish (Anaphylaxis)  vancomycine (Anaphylaxis)      PAST MEDICAL & SURGICAL HISTORY:  Asthma  never been intubated, or hospitalized.,    HTN - Hypertension    Hyperlipidemia    MVP (Mitral Valve Prolapse)    Renal Calculi    GERD (Gastroesophageal Reflux Disease)    Carpal Tunnel Syndrome    Cervical herniated disc    Migraine    Obesity    TIA (transient ischemic attack)  x3 last one in 2/16    DVT (deep venous thrombosis)  right lower leg 2015, s/p right knee surgery to correct congenital abnormality    Diabetes mellitus  II    COPD, moderate  last flare 3 years ago, never hospitalized    Cervical Fusion x 2  1999, 2000    Brain Aneurysm (coiling)  2008    S/P Total Abdominal Hysterectomy  1991    S/P Arthroscopic Knee Surgery  2006    H/O cataract extraction  2013    S/P knee surgery  2015 right    History of cerebral aneurysm  s/p cerebral stent june 2020    S/P rotator cuff repair  right--2017    Status post osteotomy  left knee 2019        Social History:   FAMILY HISTORY:  Current Medications:     Labs:                   Blood Bank:       Assessment/Plan:   This is a 66 year old right hand dominant female   Patient presents to neuro-IR for selective cerebral angiography.   Procedure, goals, risks, benefits and alternatives  were discussed with patient and patient's family.  All questions were answered.  Risks include but are not limited to stroke, vessel injury, hemorrhage, and or right  groin hematoma.  Patient demonstrates understanding  of all risks involved with this procedure and wishes to continue.   Appropriate  content was obtained from patient and consent is in the patient's chart. Interventional Neuro Radiology  Pre-Procedure Note PA-C    This is a 66 year old right hand dominant female            Allergies: adhesives (Unknown)  NSAID (Anaphylaxis)  onion, garlic (Anaphylaxis)  PCN (Anaphylaxis)  shellfish (Anaphylaxis)  vancomycine (Anaphylaxis)      PMHX: Asthma  never been intubated, or hospitalized.,    HTN - Hypertension    Hyperlipidemia    MVP (Mitral Valve Prolapse)    Renal Calculi    GERD (Gastroesophageal Reflux Disease)    Carpal Tunnel Syndrome    Cervical herniated disc    Migraine    Obesity    TIA (transient ischemic attack)  x3 last one in 2/16    DVT (deep venous thrombosis)  right lower leg 2015, s/p right knee surgery to correct congenital abnormality    Diabetes mellitus  II    COPD, moderate  last flare 3 years ago, never hospitalized    Cervical Fusion x 2  1999, 2000    Brain Aneurysm (coiling)  2008    S/P Total Abdominal Hysterectomy  1991    S/P Arthroscopic Knee Surgery  2006    H/O cataract extraction  2013    S/P knee surgery  2015 right    History of cerebral aneurysm  s/p cerebral stent june 2020    S/P rotator cuff repair  right--2017    Status post osteotomy  left knee 2019        Social History:   FAMILY HISTORY:  Current Medications:     Labs:                   Blood Bank:       Assessment/Plan:   This is a 66 year old right hand dominant female   Patient presents to neuro-IR for selective cerebral angiography.   Procedure, goals, risks, benefits and alternatives  were discussed with patient and patient's family.  All questions were answered.  Risks include but are not limited to stroke, vessel injury, hemorrhage, and or right  groin hematoma.  Patient demonstrates understanding  of all risks involved with this procedure and wishes to continue.   Appropriate  content was obtained from patient and consent is in the patient's chart. Interventional Neuro Radiology  Pre-Procedure Note PA-C    This is a 66 year old right hand dominant female with a past medical history significant for endovascular treatment of a left PICA aneurysm. Patient presents to Neuro IR for a catheter cerebral angiogram to monitor aneurysm. Patient is accompanied by her .     Upon exam patient is A + O x 3, speech is fluent, recent and remote memory intact, follows commands, moves all extremities, motor 5/5, and ambulates without assist.      Allergies: adhesives (Unknown)  NSAID (Anaphylaxis)  onion, garlic (Anaphylaxis)  PCN (Anaphylaxis)  shellfish (Anaphylaxis)  Vancomycin  (Anaphylaxis)    PMHX: Asthma, Hypertension, Hyperlipidemia, Mitral Valve Prolapse, Renal Calculi, GERD, Carpal Tunnel Syndrome, Cervical herniated disc, Migraine, Obesity, TIA, DVT (deep venous thrombosis, Diabetes mellitus 2, COPD, Cervical Fusion x 2, Aneurysm (coiling), Total Abdominal Hysterectomy  PSHX: Arthroscopic Knee Surgery, cataract extraction, right knee surgery, cerebral aneurysm, right rotator cuff repair  Social History:    FAMILY HISTORY: non-contributory   Current Medications: Brilinta 90mg every 12 hours     CBC:          14.8   8.09  45.7  207     138  102  11     3.5   22   0.65  159      Blood Bank: O positive available     Assessment/Plan:   This is a 66 year old right hand dominant female with endovascular treatment of a right PICA aneurysm. Patient presents to Neuro IR for a catheter cerebral angiogram to monitor aneurysm. Procedure, goals, risks, benefits and alternatives were discussed with patient. All questions were answered. Risks include but are not limited to stroke, vessel injury, hemorrhage, right wrist pain and or right groin hematoma. Patient demonstrates understanding of all risks involved with this procedure and wishes to continue. Appropriate content was obtained from patient and consent is in the patient's chart.

## 2021-09-15 NOTE — PACU DISCHARGE NOTE - MENTAL STATUS: PATIENT PARTICIPATION
Awake
home
Additional Notes: Patient uses coconut oil and that resolves itch
Detail Level: Simple
Additional Notes: Rubs on patients glasses

## 2021-09-15 NOTE — ASU DISCHARGE PLAN (ADULT/PEDIATRIC) - CARE PROVIDER_API CALL
Wendy Deleon; MPH)  Radiology  [address], [address]  [place] [address]  Phone: [phone]  Fax: [phone]  Follow Up Time:

## 2021-09-15 NOTE — CHART NOTE - NSCHARTNOTEFT_GEN_A_CORE
Interventional Neuro- Radiology   Procedure Note PA-MARK    Procedure: Catheter Cerebral Angiography   Pre- Procedure Diagnosis:  Post- Procedure Diagnosis:    : Dr Wendy Deleon   Fellow:   Physician Assistant: Yisel Andrew PA-C    Nurse:  Radiologic Tech:  Anesthesiologist:  Sheath:      I/Os: EBL less than 10cc  IV fluids:     cc     Urine output     cc    Contrast Omnipaque 240      cc             Vitals: BP         HR      Spo2     %          Preliminary Report:  Using a 5 Indonesian short sheath to the left radial artery under MAC sedation via left vertebral artery,  left internal carotid artery, left external carotid artery, right vertebral artery, right internal carotid artery, right external carotid artery a selective cerebral angiography was performed and demonstrated                       Official note to follow.  Patient tolerated procedure well, hemodynamically stable, no change in neurological status compared to baseline. Results discussed with patient and patient's family. Right groin sheath was removed, manual compression held to hemostasis for 20 minutes, no active bleeding, no hematoma, quick clot and safeguard balloon dressing applied at Interventional Neuro- Radiology   Procedure Note PA-C    Procedure: Catheter Cerebral Angiography   Pre- Procedure Diagnosis:  Post- Procedure Diagnosis:    : Dr Wendy Deleon   Fellow:   Physician Assistant: Yisel Andrew PA-C    Nurse:                   Linda James RN  Radiologic Tech:     Anesthesiologist:    Dr Adair Garcia   Sheath:      I/Os: EBL less than 10cc  IV fluids:     cc     Urine output     cc    Contrast Omnipaque 240      cc             Vitals: BP         HR      Spo2 100%          Preliminary Report:  Using a 5 Hungarian short sheath to the left radial artery under MAC sedation via left vertebral artery,  left internal carotid artery, left external carotid artery, right vertebral artery, right internal carotid artery, right external carotid artery a selective cerebral angiography was performed and demonstrated                       Official note to follow.  Patient tolerated procedure well, hemodynamically stable, no change in neurological status compared to baseline. Results discussed with patient and patient's family. Right groin sheath was removed, manual compression held to hemostasis for 20 minutes, no active bleeding, no hematoma, quick clot and safeguard balloon dressing applied at Interventional Neuro- Radiology   Procedure Note PA-C    Procedure: Catheter Cerebral Angiography   Pre- Procedure Diagnosis:  Post- Procedure Diagnosis: small residual left PICA aneurysm     : Dr Wendy Deleon   Fellow:    Dr Micheal Quezada   Physician Assistant: Yisel Andrew PA-C    Nurse:                   Linda James RN  Radiologic Tech:    Jin Dukes LRT   Anesthesiologist:    Dr Adair Garcia   Sheath:                   5/4 left radial sheath       I/Os: EBL less than 10cc  IV fluids: 100cc Urine due to void Contrast Omnipaque 240  21cc             Vitals: BP         HR      Spo2 100%          Preliminary Report:  Using a 5/4 Hong Konger short sheath to the left radial artery under MAC sedation via left vertebral artery a selective cerebral angiography was performed and demonstrated a small residual left PICA aneurysm. Official note to follow.  Patient tolerated procedure well, hemodynamically stable, no change in neurological status compared to baseline. Results discussed with patient and patient's family. Right groin sheath was r Interventional Neuro- Radiology   Procedure Note PA-C    Procedure: Catheter Cerebral Angiography   Pre- Procedure Diagnosis: Left PICA artery aneurysm   Post- Procedure Diagnosis: small residual left PICA aneurysm     : Dr Wendy Deleon   Fellow:    Dr Micheal Quezada   Physician Assistant: Yisel Andrew PA-C    Nurse:                   Linda James RN  Radiologic Tech:    Jin Dukes LRT   Anesthesiologist:    Dr Adair Garcia   Sheath:                   5/4 left radial sheath     I/Os: EBL less than 10cc  IV fluids: 100cc Urine due to void Contrast Omnipaque 240  21cc           Vitals: /71        HR 74    Spo2 100%          Preliminary Report:  Using a 5/4 Hebrew short sheath to the left radial artery under MAC sedation via left vertebral artery a selective cerebral angiography was performed and demonstrated a small residual left PICA aneurysm. Official note to follow.  Patient tolerated procedure well, hemodynamically stable, no change in neurological status compared to baseline. Results discussed with patient and patient's . TR band was placed at 12pm. Disposition recovery room 2nd floor. Patient was instructed to discontinue Brilinta. Interventional Neuro- Radiology   Procedure Note PA-C    Procedure: Catheter Cerebral Angiography   Pre- Procedure Diagnosis: Left PICA artery aneurysm   Post- Procedure Diagnosis: small residual left PICA aneurysm     : Dr Wendy Deleon   Fellow:    Dr Micheal Quezada   Physician Assistant: Yisel Andrew PA-C    Nurse:                    Linda James RN  Radiologic Tech:    Jin Dukes LRT   Anesthesiologist:    Dr Adair Garcia   Sheath:                   5/4 left radial sheath     I/Os: EBL less than 10cc  IV fluids: 100cc Urine due to void Contrast Omnipaque 240  21cc           Vitals: /71        HR 74    Spo2 100%          Preliminary Report:  Using a 5/4 Malagasy short sheath to the left radial artery under MAC sedation via left vertebral artery a selective cerebral angiography was performed and demonstrated a small residual left PICA aneurysm. Official note to follow.  Patient tolerated procedure well, hemodynamically stable, no change in neurological status compared to baseline. Results discussed with patient and patient's . TR band was placed at 12pm. Disposition recovery room 2nd floor. Patient was instructed to discontinue Brilinta. Interventional Neuro- Radiology   Procedure Note PA-C    Procedure: Catheter Cerebral Angiography   Pre- Procedure Diagnosis: Left PICA artery aneurysm   Post- Procedure Diagnosis: small residual left PICA aneurysm     : Dr Wendy Deleon   Fellow:    Dr Micheal Quezada   Physician Assistant: Yisel Andrew PA-C    Nurse:                    Linda James RN  Radiologic Tech:    Jin Dukes LRT   Anesthesiologist:    Dr Adair Garcia   Sheath:                   5/4 left radial sheath     I/Os: EBL less than 10cc  IV fluids: 100cc Urine due to void Contrast Omnipaque 240  21cc           Vitals: /71        HR 74    Spo2 100%          Preliminary Report:  Using a 5/4 Chilean short sheath to the left radial artery under MAC sedation via left vertebral artery a selective cerebral angiography was performed and demonstrated a small residual left PICA aneurysm. Official note to follow.  Patient tolerated procedure well, hemodynamically stable, no change in neurological status compared to baseline. Results discussed with patient and patient's . TR band was placed at 12pm. Disposition recovery room 2nd floor. Patient was instructed to discontinue Brilinta and take ASA 81mg x 2, daily.

## 2021-09-17 ENCOUNTER — NON-APPOINTMENT (OUTPATIENT)
Age: 66
End: 2021-09-17

## 2021-09-17 PROBLEM — J44.9 CHRONIC OBSTRUCTIVE PULMONARY DISEASE, UNSPECIFIED: Chronic | Status: ACTIVE | Noted: 2021-09-10

## 2021-09-22 DIAGNOSIS — Z09 ENCOUNTER FOR FOLLOW-UP EXAMINATION AFTER COMPLETED TREATMENT FOR CONDITIONS OTHER THAN MALIGNANT NEOPLASM: ICD-10-CM

## 2021-10-09 ENCOUNTER — TRANSCRIPTION ENCOUNTER (OUTPATIENT)
Age: 66
End: 2021-10-09

## 2021-11-02 ENCOUNTER — APPOINTMENT (OUTPATIENT)
Dept: NEUROSURGERY | Facility: CLINIC | Age: 66
End: 2021-11-02
Payer: MEDICARE

## 2021-11-02 VITALS
HEART RATE: 79 BPM | SYSTOLIC BLOOD PRESSURE: 112 MMHG | WEIGHT: 172 LBS | OXYGEN SATURATION: 94 % | HEIGHT: 64 IN | BODY MASS INDEX: 29.37 KG/M2 | TEMPERATURE: 98 F | DIASTOLIC BLOOD PRESSURE: 73 MMHG

## 2021-11-02 DIAGNOSIS — I67.1 CEREBRAL ANEURYSM, NONRUPTURED: ICD-10-CM

## 2021-11-02 PROCEDURE — 99213 OFFICE O/P EST LOW 20 MIN: CPT

## 2021-11-02 RX ORDER — OXYCODONE AND ACETAMINOPHEN 5; 325 MG/1; MG/1
5-325 TABLET ORAL
Qty: 14 | Refills: 0 | Status: DISCONTINUED | COMMUNITY
Start: 2020-09-29 | End: 2021-11-02

## 2021-11-02 RX ORDER — TICAGRELOR 90 MG/1
90 TABLET ORAL
Qty: 60 | Refills: 6 | Status: DISCONTINUED | COMMUNITY
Start: 2021-04-16 | End: 2021-11-02

## 2021-11-02 NOTE — PHYSICAL EXAM
[General Appearance - Alert] : alert [General Appearance - In No Acute Distress] : in no acute distress [General Appearance - Well-Appearing] : healthy appearing [Oriented To Time, Place, And Person] : oriented to person, place, and time [Person] : oriented to person [Place] : oriented to place [Time] : oriented to time [Abnormal Walk] : normal gait [Sclera] : the sclera and conjunctiva were normal [Outer Ear] : the ears and nose were normal in appearance [Neck Appearance] : the appearance of the neck was normal [] : no respiratory distress [Heart Rate And Rhythm] : heart rate was normal and rhythm regular [Skin Color & Pigmentation] : normal skin color and pigmentation

## 2021-11-28 NOTE — HISTORY OF PRESENT ILLNESS
[FreeTextEntry1] : Susnhine Castrejon is a pleasant 66 year old right handed female with a history of aneurysmal SAH and stroke who presents for follow up.\par \par In 2008 she had aneurysmal SAH from LEFT PICA aneurysm (Dilai). This was treated with coils and complicated by stroke. The aneurysm recurred and I treated it with flow diverter stent in June 2020. This was an uneventful procedure.\par \par Since her SAH she has had chronic headaches and fullness/muffled sound in her ears.\par \par Catheter Cerebral Angiogram 9/15/2021:\par Residual LEFT PICA aneurysm\par PAtent stent with good flow distally\par \par She was advised to stop Brilinta and continue ASA 162mg daily.\par \par

## 2021-11-28 NOTE — ASSESSMENT
[FreeTextEntry1] : Impression:\par Post embolization of recurrent - remotely ruptured - LEFT PICA aneurysm JUN 2020\par Expected residual aneurysm on most recent catheter angiogram, patent stent with good flow distally\par \par \par Plan:\par MRA Head w/wo in 9/2022\par Continue ASA 162mg Daily\par Follow Up with Me After Imaging

## 2021-12-07 ENCOUNTER — NON-APPOINTMENT (OUTPATIENT)
Age: 66
End: 2021-12-07

## 2022-06-24 ENCOUNTER — TRANSCRIPTION ENCOUNTER (OUTPATIENT)
Age: 67
End: 2022-06-24

## 2022-06-30 ENCOUNTER — TRANSCRIPTION ENCOUNTER (OUTPATIENT)
Age: 67
End: 2022-06-30

## 2022-06-30 ENCOUNTER — OUTPATIENT (OUTPATIENT)
Dept: OUTPATIENT SERVICES | Facility: HOSPITAL | Age: 67
LOS: 1 days | End: 2022-06-30

## 2022-06-30 ENCOUNTER — APPOINTMENT (OUTPATIENT)
Dept: MRI IMAGING | Facility: CLINIC | Age: 67
End: 2022-06-30

## 2022-06-30 DIAGNOSIS — Z98.49 CATARACT EXTRACTION STATUS, UNSPECIFIED EYE: Chronic | ICD-10-CM

## 2022-06-30 DIAGNOSIS — Z98.89 OTHER SPECIFIED POSTPROCEDURAL STATES: Chronic | ICD-10-CM

## 2022-06-30 DIAGNOSIS — Z86.79 PERSONAL HISTORY OF OTHER DISEASES OF THE CIRCULATORY SYSTEM: Chronic | ICD-10-CM

## 2022-06-30 DIAGNOSIS — Z98.890 OTHER SPECIFIED POSTPROCEDURAL STATES: Chronic | ICD-10-CM

## 2022-06-30 DIAGNOSIS — I67.1 CEREBRAL ANEURYSM, NONRUPTURED: ICD-10-CM

## 2022-06-30 PROCEDURE — 70546 MR ANGIOGRAPH HEAD W/O&W/DYE: CPT | Mod: 26

## 2022-07-05 ENCOUNTER — TRANSCRIPTION ENCOUNTER (OUTPATIENT)
Age: 67
End: 2022-07-05

## 2022-07-18 ENCOUNTER — TRANSCRIPTION ENCOUNTER (OUTPATIENT)
Age: 67
End: 2022-07-18

## 2022-07-19 ENCOUNTER — TRANSCRIPTION ENCOUNTER (OUTPATIENT)
Age: 67
End: 2022-07-19

## 2022-07-25 ENCOUNTER — TRANSCRIPTION ENCOUNTER (OUTPATIENT)
Age: 67
End: 2022-07-25

## 2022-08-02 NOTE — H&P PST ADULT - NS SC CAGE ALCOHOL CUT DOWN
Quality 110: Preventive Care And Screening: Influenza Immunization: Influenza Immunization previously received during influenza season Detail Level: Detailed Additional Notes: Covid-19 vaccine: yes no

## 2022-08-08 NOTE — HISTORY OF PRESENT ILLNESS
[FreeTextEntry1] : Sunshine Castrejon is a pleasant 66 year old right handed female with a history of aneurysmal SAH and stroke who presents for follow up.\par \par In 2008 she had aneurysmal SAH from LEFT PICA aneurysm (D[place]). This was treated with coils and complicated by stroke. The aneurysm recurred and I treated it with flow diverter stent in June 2020. This was an uneventful procedure.\par \par Since her SAH she has had chronic headaches and fullness/muffled sound in her ears.\par \par Catheter Cerebral Angiogram 9/15/2021:\par Residual LEFT PICA aneurysm\par PAtent stent with good flow distally\par \par She was advised to stop Brilinta and continue ASA 162mg daily.\par \par

## 2022-08-08 NOTE — REASON FOR VISIT
[Follow-Up: _____] : a [unfilled] follow-up visit [FreeTextEntry1] : Reviewed results of MRI, MRA Head w/wo?

## 2022-08-09 ENCOUNTER — APPOINTMENT (OUTPATIENT)
Dept: NEUROSURGERY | Facility: CLINIC | Age: 67
End: 2022-08-09

## 2022-08-15 ENCOUNTER — APPOINTMENT (OUTPATIENT)
Dept: MRI IMAGING | Facility: CLINIC | Age: 67
End: 2022-08-15

## 2022-08-15 ENCOUNTER — OUTPATIENT (OUTPATIENT)
Dept: OUTPATIENT SERVICES | Facility: HOSPITAL | Age: 67
LOS: 1 days | End: 2022-08-15

## 2022-08-15 DIAGNOSIS — I67.1 CEREBRAL ANEURYSM, NONRUPTURED: ICD-10-CM

## 2022-08-15 DIAGNOSIS — Z98.89 OTHER SPECIFIED POSTPROCEDURAL STATES: Chronic | ICD-10-CM

## 2022-08-15 DIAGNOSIS — Z98.49 CATARACT EXTRACTION STATUS, UNSPECIFIED EYE: Chronic | ICD-10-CM

## 2022-08-15 DIAGNOSIS — Z98.890 OTHER SPECIFIED POSTPROCEDURAL STATES: Chronic | ICD-10-CM

## 2022-08-15 DIAGNOSIS — Z86.79 PERSONAL HISTORY OF OTHER DISEASES OF THE CIRCULATORY SYSTEM: Chronic | ICD-10-CM

## 2022-08-15 PROCEDURE — 70551 MRI BRAIN STEM W/O DYE: CPT | Mod: 26

## 2022-09-27 NOTE — ASU DISCHARGE PLAN (ADULT/PEDIATRIC) - PROCEDURE
PRIMARY DIAGNOSIS: MEDICALLY STABLE, PLACEMENT  Pt alert and oriented - lethargic/drowsy at times, not OOB - can assist with turns and upper body repositioning, tolerating PO meds and regular diet. Incontinent. No IV access. BG checks and vitals BID. VSS. Pt medically stable and awaiting placement. SW following.  OUTPATIENT/OBSERVATION GOALS TO BE MET BEFORE DISCHARGE:  1. ADLs back to baseline: Yes    2. Activity and level of assistance: Lift assist, bedfast - can assist with turns and upper body repositioning    3. Pain status: Pain free.    4. Return to near baseline physical activity: Yes     Discharge Planner Nurse   Safe discharge environment identified: No  Barriers to discharge: Yes, placement       Entered by: Adilson Alvarez RN 09/27/2022    Please review provider order for any additional goals.   Nurse to notify provider when observation goals have been met and patient is ready for discharge.     follow up cerebral angiogram

## 2022-10-18 NOTE — ED ADULT NURSE NOTE - PAIN RATING/NUMBER SCALE (0-10): ACTIVITY
Jacque Donnelly was recently discharged from the [facility] ([facility]) and needs follow-up. There are no pending appointments listed. There is no documentation about the next INR assessment.     [facility] team called patient to coordinate care and attempt to schedule next INR assessment.     Patient was NOT scheduled - [facility] team unable to contact patient / family.     Left message for pt to call [facility] scheduling or Primary care office to schedule f/u inr    Recommended date of next INR: ASAP    
Secure chat message sent to PCP support staff to assist with additional outreach for pt scheduling   
History of lithotripsy    History of shoulder surgery    History of vasectomy
8

## 2022-12-06 NOTE — H&P PST ADULT - NS ABD PE RECTAL EXAM
Pre-op History and Physical      Patient:  Janyth Blizzard  MRN: [id number]  YOB: 1970    HISTORY OF PRESENT ILLNESS:     The patient is a 46 y.o. male who presents with elevated PSA, abnormal prostate MRI. Here for Fusion prostate biopsy. Patient's old records, notes and chart reviewed and summarized above. Past Medical History:    Past Medical History:   Diagnosis Date    Allergies     BPH with obstruction/lower urinary tract symptoms 10/25/2022    Dyslipidemia 07/28/2022    ED (erectile dysfunction) 11/01/2022    Elevated hemoglobin (HCC) 06/22/2021    Elevated PSA     Leukocytosis 08/29/2021    Seasonal allergies 07/28/2022       Past Surgical History:    No past surgical history on file. Medications Prior to Admission:    Prior to Admission medications    Medication Sig Start Date End Date Taking? Authorizing Provider   fluticasone (FLONASE) 50 MCG/ACT nasal spray 1 spray by Each Nostril route daily   Yes Historical Provider, MD   Multiple Vitamins-Minerals (THERAPEUTIC MULTIVITAMIN-MINERALS) tablet Take 1 tablet by mouth daily   Yes Historical Provider, MD   [facility] ([address] [place] [address]) Take by mouth   Yes Historical Provider, MD   montelukast (SINGULAIR) 10 MG tablet Take 10 mg by mouth nightly   Yes Historical Provider, MD   tadalafil (CIALIS) 5 MG tablet Take 5 mg by mouth as needed for Erectile Dysfunction   Yes Historical Provider, MD   levocetirizine (XYZAL ALLERGY 24HR) 5 MG tablet Take 5 mg by mouth nightly   Yes Historical Provider, MD   azithromycin (ZITHROMAX) 250 MG tablet Take 1 tablet by mouth daily. 12/21/13   Lavelle Mccann MD   ibuprofen (ADVIL;MOTRIN) 800 MG tablet Take 1 tablet by mouth every 8 hours as needed for Pain. 12/21/13   Lavelle Mccann MD       Allergies:  Patient has no known allergies.     Social History:    Social History     Socioeconomic History    Marital status:      Spouse name: Not on file    Number of children: Not on file    Years of education: Not on file    Highest education level: Not on file   Occupational History    Not on file   Tobacco Use    Smoking status: Former     Types: Cigarettes    Smokeless tobacco: Not on file   Substance and Sexual Activity    Alcohol use: Not on file    Drug use: Not on file    Sexual activity: Not on file   Other Topics Concern    Not on file   Social History Narrative    Not on file     Social Determinants of Health     Financial Resource Strain: Not on file   Food Insecurity: Not on file   Transportation Needs: Not on file   Physical Activity: Not on file   Stress: Not on file   Social Connections: Not on file   Intimate Partner Violence: Not on file   Housing Stability: Not on file       Family History:    Family History   Problem Relation Age of Onset    High Blood Pressure Mother     Diabetes Mother     High Blood Pressure Father     Diabetes Father        REVIEW OF SYSTEMS:  Constitutional: negative  Eyes: negative  Respiratory: negative  Cardiovascular: negative  Gastrointestinal: negative  Genitourinary: no acute issues  Musculoskeletal: negative  Skin: negative   Neurological: negative  Hematological/Lymphatic: negative  Psychological: negative    PHYSICAL EXAM:    No data found. Constitutional: Patient in NAD  Neuro: Alert and oriented to person, place, and time  Psych: Mood and affect normal  Skin: Clean, dry, intact   Lungs: Respiratory effort normal, CTA  Cardiovascular:  Normal peripheral pulses; no murmur. Normal rhythm  Abdomen: Soft, non-tender, non-distended, no hepatosplenomegaly or hernia, CVA tenderness none  Bladder: Non-tender and non-disdended   : Non-tender, skin intact, no lesions       LABS:   No results for input(s): WBC, HGB, HCT, MCV, PLT in the last 72 hours. No results for input(s): NA, K, CL, CO2, PHOS, BUN, CREATININE, CA in the last 72 hours.   No results found for: PSA      Urinalysis: No results for input(s): COLORU, PHUR, LABCAST, WBCUA, RBCUA, MUCUS, TRICHOMONAS, YEAST, BACTERIA, CLARITYU, SPECGRAV, LEUKOCYTESUR, UROBILINOGEN, Ezzie Sydnee in the last 72 hours. Invalid input(s): NITRATE, GLUCOSEUKETONESUAMORPHOUS     -----------------------------------------------------------------    ASSESSMENT AND PLAN:    Impression:    Elevated PSA  There is no problem list on file for this patient. Plan: Fusion prostate biopsy in OR today.     Consent obtained    Bern Warren MD not examined

## 2023-05-31 ENCOUNTER — OUTPATIENT (OUTPATIENT)
Dept: OUTPATIENT SERVICES | Facility: HOSPITAL | Age: 68
LOS: 1 days | Discharge: HOME | End: 2023-05-31
Payer: MEDICARE

## 2023-05-31 VITALS
HEIGHT: 64 IN | OXYGEN SATURATION: 99 % | TEMPERATURE: 98 F | HEART RATE: 67 BPM | WEIGHT: 166.89 LBS | RESPIRATION RATE: 18 BRPM | DIASTOLIC BLOOD PRESSURE: 61 MMHG | SYSTOLIC BLOOD PRESSURE: 139 MMHG

## 2023-05-31 DIAGNOSIS — Z98.890 OTHER SPECIFIED POSTPROCEDURAL STATES: Chronic | ICD-10-CM

## 2023-05-31 DIAGNOSIS — Z86.79 PERSONAL HISTORY OF OTHER DISEASES OF THE CIRCULATORY SYSTEM: Chronic | ICD-10-CM

## 2023-05-31 DIAGNOSIS — Z98.89 OTHER SPECIFIED POSTPROCEDURAL STATES: Chronic | ICD-10-CM

## 2023-05-31 DIAGNOSIS — Z98.49 CATARACT EXTRACTION STATUS, UNSPECIFIED EYE: Chronic | ICD-10-CM

## 2023-05-31 DIAGNOSIS — Z12.11 ENCOUNTER FOR SCREENING FOR MALIGNANT NEOPLASM OF COLON: ICD-10-CM

## 2023-05-31 DIAGNOSIS — Z90.2 ACQUIRED ABSENCE OF LUNG [PART OF]: Chronic | ICD-10-CM

## 2023-05-31 PROCEDURE — 88305 TISSUE EXAM BY PATHOLOGIST: CPT

## 2023-05-31 PROCEDURE — C1889: CPT

## 2023-05-31 PROCEDURE — 88305 TISSUE EXAM BY PATHOLOGIST: CPT | Mod: 26

## 2023-05-31 RX ORDER — PREGABALIN 225 MG/1
1 CAPSULE ORAL
Qty: 0 | Refills: 0 | DISCHARGE

## 2023-05-31 RX ORDER — CHOLECALCIFEROL (VITAMIN D3) 125 MCG
1 CAPSULE ORAL
Qty: 0 | Refills: 0 | DISCHARGE

## 2023-05-31 NOTE — ASU PATIENT PROFILE, ADULT - NSICDXPASTSURGICALHX_GEN_ALL_CORE_FT
PAST SURGICAL HISTORY:  Brain Aneurysm (coiling) 2008    Cervical Fusion x 2 1999, 2000    H/O cataract extraction 2013    History of cerebral aneurysm s/p cerebral stent june 2020    S/P Arthroscopic Knee Surgery 2006    S/P knee surgery 2015 right    S/P lobectomy of lung     S/P rotator cuff repair right--2017    S/P Total Abdominal Hysterectomy 1991    Status post osteotomy left knee 2019

## 2023-05-31 NOTE — ASU PATIENT PROFILE, ADULT - ACCEPTABLE
Physical Therapy Treatment Note  [facility]  [address], [address]  [place]  [address]    VISIT#: [id number]    Subjective   Zamzam Pham reports: Knee feels good, didn't have any issues after last session and hasn't been sore.     Objective     See Exercise, Manual, and Modality Logs for complete treatment.     Patient Education: continue jump squats at home, add skater lunge at home     Assessment/Plan  Pt with good tolerance to new ex today with foot on blue side of bosu in lunge position, however she did have slight LOB in this position. She shows continued knee valgus on the left with landing from plyometric positions, therefore will continue to work this area and hip ER in functional positions.     Progress per Plan of Care        Timed:         Manual Therapy:         mins  44142;     Therapeutic Exercise:    27     mins  08334;     Neuromuscular Shawna:    16    mins  56795;    Therapeutic Activity:     10     mins  73263;     Gait Training:           mins  32413;     Ultrasound:          mins  00734;    Ionto                                   mins   99975  Self Care                            mins   97286      Un-Timed:  Electrical Stimulation:         mins  44736 ( );  Canalith Repos                   mins  49124  Traction          mins 79471  Dry Needle                 ______ mins DRYNDL  Low Eval          Mins  91726  Mod Eval          Mins  78609  High Eval                            Mins  17706  Re-Eval                               mins  52249    Timed Treatment:   53   mins   Total Treatment:     53   mins    Sary Mckeon PT    Physical Therapist  
7

## 2023-05-31 NOTE — ASU PATIENT PROFILE, ADULT - FALL HARM RISK - UNIVERSAL INTERVENTIONS
Bed in lowest position, wheels locked, appropriate side rails in place/Call bell, personal items and telephone in reach/Instruct patient to call for assistance before getting out of bed or chair/Non-slip footwear when patient is out of bed/[place] to call system/Physically safe environment - no spills, clutter or unnecessary equipment/Purposeful Proactive Rounding/Room/bathroom lighting operational, light cord in reach

## 2023-06-01 LAB — SURGICAL PATHOLOGY STUDY: SIGNIFICANT CHANGE UP

## 2023-06-04 DIAGNOSIS — I25.10 ATHEROSCLEROTIC HEART DISEASE OF NATIVE CORONARY ARTERY WITHOUT ANGINA PECTORIS: ICD-10-CM

## 2023-06-04 DIAGNOSIS — E78.5 HYPERLIPIDEMIA, UNSPECIFIED: ICD-10-CM

## 2023-06-04 DIAGNOSIS — K57.30 DIVERTICULOSIS OF LARGE INTESTINE WITHOUT PERFORATION OR ABSCESS WITHOUT BLEEDING: ICD-10-CM

## 2023-06-04 DIAGNOSIS — K63.89 OTHER SPECIFIED DISEASES OF INTESTINE: ICD-10-CM

## 2023-06-04 DIAGNOSIS — Z87.442 PERSONAL HISTORY OF URINARY CALCULI: ICD-10-CM

## 2023-06-04 DIAGNOSIS — K64.9 UNSPECIFIED HEMORRHOIDS: ICD-10-CM

## 2023-06-04 DIAGNOSIS — K52.9 NONINFECTIVE GASTROENTERITIS AND COLITIS, UNSPECIFIED: ICD-10-CM

## 2023-06-04 DIAGNOSIS — Z85.118 PERSONAL HISTORY OF OTHER MALIGNANT NEOPLASM OF BRONCHUS AND LUNG: ICD-10-CM

## 2023-06-04 DIAGNOSIS — Z79.899 OTHER LONG TERM (CURRENT) DRUG THERAPY: ICD-10-CM

## 2023-06-04 DIAGNOSIS — I10 ESSENTIAL (PRIMARY) HYPERTENSION: ICD-10-CM

## 2023-06-04 DIAGNOSIS — D12.5 BENIGN NEOPLASM OF SIGMOID COLON: ICD-10-CM

## 2023-06-04 DIAGNOSIS — Z12.11 ENCOUNTER FOR SCREENING FOR MALIGNANT NEOPLASM OF COLON: ICD-10-CM

## 2023-06-04 DIAGNOSIS — E11.9 TYPE 2 DIABETES MELLITUS WITHOUT COMPLICATIONS: ICD-10-CM

## 2023-06-04 DIAGNOSIS — Z86.73 PERSONAL HISTORY OF TRANSIENT ISCHEMIC ATTACK (TIA), AND CEREBRAL INFARCTION WITHOUT RESIDUAL DEFICITS: ICD-10-CM

## 2023-06-04 DIAGNOSIS — D12.3 BENIGN NEOPLASM OF TRANSVERSE COLON: ICD-10-CM

## 2023-06-04 DIAGNOSIS — J44.9 CHRONIC OBSTRUCTIVE PULMONARY DISEASE, UNSPECIFIED: ICD-10-CM

## 2023-06-14 PROBLEM — C34.90 MALIGNANT NEOPLASM OF UNSPECIFIED PART OF UNSPECIFIED BRONCHUS OR LUNG: Chronic | Status: ACTIVE | Noted: 2023-05-31

## 2023-09-14 ENCOUNTER — APPOINTMENT (OUTPATIENT)
Dept: GASTROENTEROLOGY | Facility: CLINIC | Age: 68
End: 2023-09-14

## 2024-01-09 ENCOUNTER — EMERGENCY (EMERGENCY)
Facility: HOSPITAL | Age: 69
LOS: 1 days | Discharge: LEFT BEFORE DISCHARGE | End: 2024-01-09
Payer: MEDICARE

## 2024-01-09 VITALS
OXYGEN SATURATION: 97 % | DIASTOLIC BLOOD PRESSURE: 74 MMHG | HEART RATE: 104 BPM | HEIGHT: 64 IN | WEIGHT: 167.33 LBS | TEMPERATURE: 98 F | SYSTOLIC BLOOD PRESSURE: 136 MMHG | RESPIRATION RATE: 20 BRPM

## 2024-01-09 DIAGNOSIS — Z98.89 OTHER SPECIFIED POSTPROCEDURAL STATES: Chronic | ICD-10-CM

## 2024-01-09 DIAGNOSIS — Z98.890 OTHER SPECIFIED POSTPROCEDURAL STATES: Chronic | ICD-10-CM

## 2024-01-09 DIAGNOSIS — Z90.2 ACQUIRED ABSENCE OF LUNG [PART OF]: Chronic | ICD-10-CM

## 2024-01-09 DIAGNOSIS — Z86.79 PERSONAL HISTORY OF OTHER DISEASES OF THE CIRCULATORY SYSTEM: Chronic | ICD-10-CM

## 2024-01-09 DIAGNOSIS — Z98.49 CATARACT EXTRACTION STATUS, UNSPECIFIED EYE: Chronic | ICD-10-CM

## 2024-01-09 PROCEDURE — L9991: CPT

## 2024-01-09 NOTE — ED ADULT NURSE NOTE - OBJECTIVE STATEMENT
assumed care of pt at 1530. c/o cough, congestion and worsening sob since this morning. dx with flu last week, last dose of tamiflu 2 days ago. denies fevers. rr even and unlabored. productive green sputum reported. anox4. pt educated on plan of care, pt able to successfully teach back plan of care to RN, RN will continue to reeducate pt during hospital stay.

## 2024-01-09 NOTE — ED ADULT NURSE NOTE - NSFALLUNIVINTERV_ED_ALL_ED
Bed/Stretcher in lowest position, wheels locked, appropriate side rails in place/Call bell, personal items and telephone in reach/Instruct patient to call for assistance before getting out of bed/chair/stretcher/Non-slip footwear applied when patient is off stretcher/[place] to call system/Physically safe environment - no spills, clutter or unnecessary equipment/Purposeful proactive rounding/Room/bathroom lighting operational, light cord in reach Bed/Stretcher in lowest position, wheels locked, appropriate side rails in place/Call bell, personal items and telephone in reach/Instruct patient to call for assistance before getting out of bed/chair/stretcher/Non-slip footwear applied when patient is off stretcher/[place] to call system/Physically safe environment - no spills, clutter or unnecessary equipment/Purposeful proactive rounding/Room/bathroom lighting operational, light cord in reach

## 2024-01-09 NOTE — ED ADULT NURSE NOTE - NSICDXPASTMEDICALHX_GEN_ALL_CORE_FT
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

## 2024-01-09 NOTE — ED ADULT TRIAGE NOTE - CHIEF COMPLAINT QUOTE
Pt was seen at Urgent Care last week and diagnosed with the Flu. Pt coming in today c/o productive cough that has not improved.

## 2024-01-10 ENCOUNTER — NON-APPOINTMENT (OUTPATIENT)
Age: 69
End: 2024-01-10

## 2024-04-23 NOTE — ED ADULT TRIAGE NOTE - NS ED NURSE BANDS TYPE
Not particularly at this time. The MRI will determine the extent of issues. If severe pain may need ED visit and may be able to get MRI sooner. Cannot do more steroids as A1C is uncontrolled. Continue with Voltaren she has ordered routine.  May want to go ahead and place order for spine consult to speed up the process for further evaluation also.   Name band;

## 2024-06-10 NOTE — ED PROVIDER NOTE - TOBACCO USE
Problem: Skin Injury Risk Increased  Goal: Skin Health and Integrity  Outcome: Ongoing, Progressing  Intervention: Optimize Skin Protection  Recent Flowsheet Documentation  Taken 6/9/2024 2229 by Cynthia Mckeon, RN  Pressure Reduction Techniques: frequent weight shift encouraged  Pressure Reduction Devices: specialty bed utilized   Goal Outcome Evaluation:  Plan of Care Reviewed With: patient        Progress: no change                                   Former smoker

## 2024-06-12 ENCOUNTER — OUTPATIENT (OUTPATIENT)
Dept: OUTPATIENT SERVICES | Facility: HOSPITAL | Age: 69
LOS: 1 days | Discharge: HOME | End: 2024-06-12

## 2024-06-12 VITALS
OXYGEN SATURATION: 99 % | WEIGHT: 169.98 LBS | SYSTOLIC BLOOD PRESSURE: 127 MMHG | TEMPERATURE: 97 F | HEIGHT: 64 IN | DIASTOLIC BLOOD PRESSURE: 59 MMHG | HEART RATE: 66 BPM

## 2024-06-12 DIAGNOSIS — Z98.890 OTHER SPECIFIED POSTPROCEDURAL STATES: Chronic | ICD-10-CM

## 2024-06-12 DIAGNOSIS — Z98.89 OTHER SPECIFIED POSTPROCEDURAL STATES: Chronic | ICD-10-CM

## 2024-06-12 DIAGNOSIS — Z90.2 ACQUIRED ABSENCE OF LUNG [PART OF]: Chronic | ICD-10-CM

## 2024-06-12 DIAGNOSIS — Z12.11 ENCOUNTER FOR SCREENING FOR MALIGNANT NEOPLASM OF COLON: ICD-10-CM

## 2024-06-12 DIAGNOSIS — Z98.49 CATARACT EXTRACTION STATUS, UNSPECIFIED EYE: Chronic | ICD-10-CM

## 2024-06-12 DIAGNOSIS — Z86.79 PERSONAL HISTORY OF OTHER DISEASES OF THE CIRCULATORY SYSTEM: Chronic | ICD-10-CM

## 2024-06-12 RX ORDER — AZELASTINE 137 UG/1
0 SPRAY, METERED NASAL
Qty: 0 | Refills: 0 | DISCHARGE

## 2024-06-12 RX ORDER — FLUTICASONE FUROATE, UMECLIDINIUM BROMIDE AND VILANTEROL TRIFENATATE 200; 62.5; 25 UG/1; UG/1; UG/1
1 POWDER RESPIRATORY (INHALATION)
Qty: 0 | Refills: 0 | DISCHARGE

## 2024-06-12 RX ORDER — ROPINIROLE 8 MG/1
0 TABLET, FILM COATED, EXTENDED RELEASE ORAL
Qty: 0 | Refills: 0 | DISCHARGE

## 2024-06-12 RX ORDER — SIMVASTATIN 20 MG/1
1 TABLET, FILM COATED ORAL
Qty: 0 | Refills: 0 | DISCHARGE

## 2024-06-12 RX ORDER — MONTELUKAST 4 MG/1
1 TABLET, CHEWABLE ORAL
Qty: 0 | Refills: 0 | DISCHARGE

## 2024-06-12 RX ORDER — EZETIMIBE 10 MG/1
1 TABLET ORAL
Qty: 0 | Refills: 0 | DISCHARGE

## 2024-06-12 RX ORDER — EMPAGLIFLOZIN, METFORMIN HYDROCHLORIDE 10; 1000 MG/1; MG/1
1 TABLET, EXTENDED RELEASE ORAL
Qty: 0 | Refills: 0 | DISCHARGE

## 2024-06-18 DIAGNOSIS — Z12.11 ENCOUNTER FOR SCREENING FOR MALIGNANT NEOPLASM OF COLON: ICD-10-CM

## 2024-06-18 DIAGNOSIS — Z86.010 PERSONAL HISTORY OF COLONIC POLYPS: ICD-10-CM

## 2024-06-18 DIAGNOSIS — I25.10 ATHEROSCLEROTIC HEART DISEASE OF NATIVE CORONARY ARTERY WITHOUT ANGINA PECTORIS: ICD-10-CM

## 2024-06-18 DIAGNOSIS — Z87.891 PERSONAL HISTORY OF NICOTINE DEPENDENCE: ICD-10-CM

## 2024-06-18 DIAGNOSIS — Z88.0 ALLERGY STATUS TO PENICILLIN: ICD-10-CM

## 2024-06-18 DIAGNOSIS — K64.9 UNSPECIFIED HEMORRHOIDS: ICD-10-CM

## 2024-06-18 DIAGNOSIS — K57.30 DIVERTICULOSIS OF LARGE INTESTINE WITHOUT PERFORATION OR ABSCESS WITHOUT BLEEDING: ICD-10-CM

## 2024-06-18 DIAGNOSIS — Z88.6 ALLERGY STATUS TO ANALGESIC AGENT: ICD-10-CM

## 2024-06-18 DIAGNOSIS — I10 ESSENTIAL (PRIMARY) HYPERTENSION: ICD-10-CM

## 2024-06-18 DIAGNOSIS — Z85.118 PERSONAL HISTORY OF OTHER MALIGNANT NEOPLASM OF BRONCHUS AND LUNG: ICD-10-CM

## 2024-08-23 NOTE — ED PROVIDER NOTE - GASTROINTESTINAL NEGATIVE STATEMENT, MLM
E-advice sent to patient with results.   no abdominal pain, no bloating, no constipation, no diarrhea, no nausea and no vomiting.

## 2024-11-01 NOTE — ASU PATIENT PROFILE, ADULT - TEACHING/LEARNING FACTORS IMPACT ABILITY TO LEARN
What Type Of Note Output Would You Prefer (Optional)?: Standard Output Is The Patient Presenting As Previously Scheduled?: Yes How Severe Is Your Rash?: mild Is This A New Presentation, Or A Follow-Up?: Rash none

## 2024-12-06 NOTE — ASU DISCHARGE PLAN (ADULT/PEDIATRIC) - SIGNS AND SYMPTOMS OF INFECTION: FEVER, REDNESS, SWELLING, FOUL SMELLING DISCHARGE
Called patient and relayed MD's message to patient. She said she has been talking to her friends and they all agreed that she shouldn't do Prolia and wants to know other options and she has other questions.  Reiterated MD's message and she verbalized understanding.    Scheduled f/u appt for 12/12/24 at 1:40 pm to go over options again and answer her questions.        Statement Selected

## 2024-12-18 NOTE — ASU PATIENT PROFILE, ADULT - NSICDXPASTMEDICALHX_GEN_ALL_CORE_FT
It was a pleasure to see you today.  You presented to the clinic for post hospital follow up    Plan:  - Follow up with urology for management of stricture and kidney stone  - Follow up with GI for esophageal hernia and fatty liver  - Norco for the pain prescribed  -     Follow up appointments:  No follow-ups on file.     Health Maintenance Due  Health Maintenance Due   Topic Date Due    Shingles Vaccine (1 of 2) Never done    Hepatitis B Vaccine (2 of 3 - 19+ 3-dose series) 01/22/2018    COVID-19 Vaccine (3 - 2024-25 season) 09/01/2024       Further Instructions:  Please schedule a follow-up with us today before you leave.     For any labs completed today, the results can be accessed through the patient portal on the [facility].  Please sign up!      You may see your test results before I do.  I will be contacting you regarding their interpretation.    If you are unable to get labs completed today, the lab is open Monday through Friday from 8 am to 4:00 pm.  Call to schedule a lab visit.  Please fast at least 8 hours prior to your lab draw if having cholesterol checked.    If you require an imaging study and need to schedule an appointment, call central scheduling at [phone] to schedule all appointments.  Please ask for the location address at the time of the phone call.    If you require a referral to see a specialist, our staff will complete this on your behalf and provide you with the referral prior to you leaving.    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

## 2025-04-07 ENCOUNTER — NON-APPOINTMENT (OUTPATIENT)
Age: 70
End: 2025-04-07

## 2025-04-24 NOTE — H&P PST ADULT - HARM RISK FACTORS
Bed: 08  Expected date:   Expected time:   Means of arrival: Ambulance Service  Comments:  EMS     no

## 2025-06-10 ENCOUNTER — OUTPATIENT (OUTPATIENT)
Dept: OUTPATIENT SERVICES | Facility: HOSPITAL | Age: 70
LOS: 1 days | End: 2025-06-10

## 2025-06-10 ENCOUNTER — NON-APPOINTMENT (OUTPATIENT)
Age: 70
End: 2025-06-10

## 2025-06-10 ENCOUNTER — OUTPATIENT (OUTPATIENT)
Dept: OUTPATIENT SERVICES | Facility: HOSPITAL | Age: 70
LOS: 1 days | End: 2025-06-10
Payer: MEDICARE

## 2025-06-10 ENCOUNTER — APPOINTMENT (OUTPATIENT)
Dept: UROLOGY | Facility: CLINIC | Age: 70
End: 2025-06-10
Payer: MEDICARE

## 2025-06-10 ENCOUNTER — APPOINTMENT (OUTPATIENT)
Dept: UROLOGY | Facility: CLINIC | Age: 70
End: 2025-06-10

## 2025-06-10 ENCOUNTER — APPOINTMENT (OUTPATIENT)
Dept: ULTRASOUND IMAGING | Facility: CLINIC | Age: 70
End: 2025-06-10
Payer: MEDICARE

## 2025-06-10 VITALS
SYSTOLIC BLOOD PRESSURE: 149 MMHG | TEMPERATURE: 98 F | RESPIRATION RATE: 17 BRPM | HEART RATE: 73 BPM | BODY MASS INDEX: 29.37 KG/M2 | WEIGHT: 172 LBS | DIASTOLIC BLOOD PRESSURE: 72 MMHG | HEIGHT: 64 IN

## 2025-06-10 DIAGNOSIS — R39.16 STRAINING TO VOID: ICD-10-CM

## 2025-06-10 DIAGNOSIS — Z98.890 OTHER SPECIFIED POSTPROCEDURAL STATES: Chronic | ICD-10-CM

## 2025-06-10 DIAGNOSIS — Z98.49 CATARACT EXTRACTION STATUS, UNSPECIFIED EYE: Chronic | ICD-10-CM

## 2025-06-10 DIAGNOSIS — Z86.79 PERSONAL HISTORY OF OTHER DISEASES OF THE CIRCULATORY SYSTEM: Chronic | ICD-10-CM

## 2025-06-10 DIAGNOSIS — Z98.89 OTHER SPECIFIED POSTPROCEDURAL STATES: Chronic | ICD-10-CM

## 2025-06-10 DIAGNOSIS — R82.90 UNSPECIFIED ABNORMAL FINDINGS IN URINE: ICD-10-CM

## 2025-06-10 DIAGNOSIS — Z90.2 ACQUIRED ABSENCE OF LUNG [PART OF]: Chronic | ICD-10-CM

## 2025-06-10 DIAGNOSIS — N39.43 POST-VOID DRIBBLING: ICD-10-CM

## 2025-06-10 DIAGNOSIS — R39.12 POOR URINARY STREAM: ICD-10-CM

## 2025-06-10 LAB
BILIRUB UR QL STRIP: NORMAL
CLARITY UR: NORMAL
COLLECTION METHOD: NORMAL
GLUCOSE UR-MCNC: ABNORMAL
HCG UR QL: 0.2 EU/DL
HGB UR QL STRIP.AUTO: ABNORMAL
KETONES UR-MCNC: NORMAL
LEUKOCYTE ESTERASE UR QL STRIP: NORMAL
NITRITE UR QL STRIP: NORMAL
PH UR STRIP: 6
PROT UR STRIP-MCNC: ABNORMAL
SP GR UR STRIP: 1.01

## 2025-06-10 PROCEDURE — 76770 US EXAM ABDO BACK WALL COMP: CPT | Mod: 26

## 2025-06-10 PROCEDURE — 51798 US URINE CAPACITY MEASURE: CPT

## 2025-06-10 PROCEDURE — 51701 INSERT BLADDER CATHETER: CPT

## 2025-06-10 PROCEDURE — 99203 OFFICE O/P NEW LOW 30 MIN: CPT | Mod: 25

## 2025-06-10 RX ORDER — EZETIMIBE 10 MG/1
10 TABLET ORAL
Refills: 0 | Status: ACTIVE | COMMUNITY

## 2025-06-11 DIAGNOSIS — Z00.00 ENCOUNTER FOR GENERAL ADULT MEDICAL EXAMINATION WITHOUT ABNORMAL FINDINGS: ICD-10-CM

## 2025-06-11 LAB
APPEARANCE: CLEAR
BACTERIA: NEGATIVE /HPF
BILIRUBIN URINE: NEGATIVE
BLOOD URINE: ABNORMAL
CAST: 1 /LPF
COLOR: YELLOW
EPITHELIAL CELLS: 1 /HPF
GLUCOSE QUALITATIVE U: 500 MG/DL
KETONES URINE: NEGATIVE MG/DL
LEUKOCYTE ESTERASE URINE: ABNORMAL
MICROSCOPIC-UA: NORMAL
NITRITE URINE: NEGATIVE
PH URINE: 7
PROTEIN URINE: NEGATIVE MG/DL
RED BLOOD CELLS URINE: 0 /HPF
SPECIFIC GRAVITY URINE: 1.01
UROBILINOGEN URINE: 0.2 MG/DL
WHITE BLOOD CELLS URINE: 64 /HPF

## 2025-06-11 PROCEDURE — 76770 US EXAM ABDO BACK WALL COMP: CPT

## 2025-06-12 ENCOUNTER — NON-APPOINTMENT (OUTPATIENT)
Age: 70
End: 2025-06-12

## 2025-06-12 DIAGNOSIS — N20.0 CALCULUS OF KIDNEY: ICD-10-CM

## 2025-06-12 DIAGNOSIS — N39.43 POST-VOID DRIBBLING: ICD-10-CM

## 2025-06-12 DIAGNOSIS — R82.90 UNSPECIFIED ABNORMAL FINDINGS IN URINE: ICD-10-CM

## 2025-06-12 DIAGNOSIS — N39.0 URINARY TRACT INFECTION, SITE NOT SPECIFIED: ICD-10-CM

## 2025-06-12 DIAGNOSIS — R39.12 POOR URINARY STREAM: ICD-10-CM

## 2025-06-12 DIAGNOSIS — R33.9 RETENTION OF URINE, UNSPECIFIED: ICD-10-CM

## 2025-06-12 DIAGNOSIS — R39.16 STRAINING TO VOID: ICD-10-CM

## 2025-06-12 DIAGNOSIS — R93.49 ABNORMAL RADIOLOGIC FINDINGS ON DIAGNOSTIC IMAGING OF OTHER URINARY ORGANS: ICD-10-CM

## 2025-06-12 LAB — BACTERIA UR CULT: NORMAL

## 2025-06-30 ENCOUNTER — APPOINTMENT (OUTPATIENT)
Dept: UROLOGY | Facility: CLINIC | Age: 70
End: 2025-06-30